# Patient Record
Sex: MALE | Race: WHITE | NOT HISPANIC OR LATINO | Employment: FULL TIME | URBAN - METROPOLITAN AREA
[De-identification: names, ages, dates, MRNs, and addresses within clinical notes are randomized per-mention and may not be internally consistent; named-entity substitution may affect disease eponyms.]

---

## 2022-10-22 ENCOUNTER — APPOINTMENT (OUTPATIENT)
Dept: GENERAL RADIOLOGY | Facility: CLINIC | Age: 37
End: 2022-10-22
Attending: EMERGENCY MEDICINE
Payer: COMMERCIAL

## 2022-10-22 ENCOUNTER — APPOINTMENT (OUTPATIENT)
Dept: CT IMAGING | Facility: CLINIC | Age: 37
End: 2022-10-22
Attending: EMERGENCY MEDICINE
Payer: COMMERCIAL

## 2022-10-22 ENCOUNTER — HOSPITAL ENCOUNTER (EMERGENCY)
Facility: CLINIC | Age: 37
Discharge: HOME | End: 2022-10-22
Attending: EMERGENCY MEDICINE | Admitting: EMERGENCY MEDICINE
Payer: COMMERCIAL

## 2022-10-22 VITALS
RESPIRATION RATE: 20 BRPM | DIASTOLIC BLOOD PRESSURE: 99 MMHG | SYSTOLIC BLOOD PRESSURE: 175 MMHG | HEART RATE: 77 BPM | OXYGEN SATURATION: 96 %

## 2022-10-22 DIAGNOSIS — R04.2 HEMOPTYSIS: ICD-10-CM

## 2022-10-22 LAB
ANION GAP SERPL CALCULATED.3IONS-SCNC: 5 MMOL/L (ref 3–14)
BASOPHILS # BLD AUTO: 0.1 10E3/UL (ref 0–0.2)
BASOPHILS NFR BLD AUTO: 1 %
BUN SERPL-MCNC: 19 MG/DL (ref 7–30)
CALCIUM SERPL-MCNC: 8.7 MG/DL (ref 8.5–10.1)
CHLORIDE BLD-SCNC: 114 MMOL/L (ref 94–109)
CO2 SERPL-SCNC: 25 MMOL/L (ref 20–32)
CREAT SERPL-MCNC: 0.98 MG/DL (ref 0.66–1.25)
D DIMER PPP FEU-MCNC: 0.34 UG/ML FEU (ref 0–0.5)
EOSINOPHIL # BLD AUTO: 0.1 10E3/UL (ref 0–0.7)
EOSINOPHIL NFR BLD AUTO: 1 %
ERYTHROCYTE [DISTWIDTH] IN BLOOD BY AUTOMATED COUNT: 12.5 % (ref 10–15)
GFR SERPL CREATININE-BSD FRML MDRD: >90 ML/MIN/1.73M2
GLUCOSE BLD-MCNC: 145 MG/DL (ref 70–99)
HCT VFR BLD AUTO: 44.1 % (ref 40–53)
HGB BLD-MCNC: 15.4 G/DL (ref 13.3–17.7)
IMM GRANULOCYTES # BLD: 0 10E3/UL
IMM GRANULOCYTES NFR BLD: 0 %
LYMPHOCYTES # BLD AUTO: 3.4 10E3/UL (ref 0.8–5.3)
LYMPHOCYTES NFR BLD AUTO: 36 %
MCH RBC QN AUTO: 30.7 PG (ref 26.5–33)
MCHC RBC AUTO-ENTMCNC: 34.9 G/DL (ref 31.5–36.5)
MCV RBC AUTO: 88 FL (ref 78–100)
MONOCYTES # BLD AUTO: 0.5 10E3/UL (ref 0–1.3)
MONOCYTES NFR BLD AUTO: 6 %
NEUTROPHILS # BLD AUTO: 5.3 10E3/UL (ref 1.6–8.3)
NEUTROPHILS NFR BLD AUTO: 56 %
NRBC # BLD AUTO: 0 10E3/UL
NRBC BLD AUTO-RTO: 0 /100
PLATELET # BLD AUTO: 227 10E3/UL (ref 150–450)
POTASSIUM BLD-SCNC: 3.6 MMOL/L (ref 3.4–5.3)
RBC # BLD AUTO: 5.01 10E6/UL (ref 4.4–5.9)
SODIUM SERPL-SCNC: 144 MMOL/L (ref 133–144)
WBC # BLD AUTO: 9.5 10E3/UL (ref 4–11)

## 2022-10-22 PROCEDURE — 71046 X-RAY EXAM CHEST 2 VIEWS: CPT

## 2022-10-22 PROCEDURE — 85025 COMPLETE CBC W/AUTO DIFF WBC: CPT | Performed by: EMERGENCY MEDICINE

## 2022-10-22 PROCEDURE — 99285 EMERGENCY DEPT VISIT HI MDM: CPT | Mod: 25 | Performed by: EMERGENCY MEDICINE

## 2022-10-22 PROCEDURE — 85379 FIBRIN DEGRADATION QUANT: CPT | Performed by: EMERGENCY MEDICINE

## 2022-10-22 PROCEDURE — 99284 EMERGENCY DEPT VISIT MOD MDM: CPT | Performed by: EMERGENCY MEDICINE

## 2022-10-22 PROCEDURE — 250N000011 HC RX IP 250 OP 636: Performed by: EMERGENCY MEDICINE

## 2022-10-22 PROCEDURE — 250N000009 HC RX 250: Performed by: EMERGENCY MEDICINE

## 2022-10-22 PROCEDURE — 36415 COLL VENOUS BLD VENIPUNCTURE: CPT | Performed by: EMERGENCY MEDICINE

## 2022-10-22 PROCEDURE — 80048 BASIC METABOLIC PNL TOTAL CA: CPT | Performed by: EMERGENCY MEDICINE

## 2022-10-22 PROCEDURE — 71260 CT THORAX DX C+: CPT

## 2022-10-22 RX ORDER — IOPAMIDOL 755 MG/ML
500 INJECTION, SOLUTION INTRAVASCULAR ONCE
Status: COMPLETED | OUTPATIENT
Start: 2022-10-22 | End: 2022-10-22

## 2022-10-22 RX ADMIN — IOPAMIDOL 75 ML: 755 INJECTION, SOLUTION INTRAVENOUS at 21:29

## 2022-10-22 RX ADMIN — SODIUM CHLORIDE 75 ML: 9 INJECTION, SOLUTION INTRAVENOUS at 21:30

## 2022-10-22 ASSESSMENT — ACTIVITIES OF DAILY LIVING (ADL)
ADLS_ACUITY_SCORE: 35
ADLS_ACUITY_SCORE: 35

## 2022-10-23 NOTE — DISCHARGE INSTRUCTIONS
Your chest xray and blood work looked good. The screening blood test for a blood clot was negative (normal).  The CT scan showed some haziness in your right lung and this could be infectious or it could be inflammatory.  Since you keep having episodes of coughing up blood I have placed a referral to pulmonology.  Should this rapidly worsen return to the emergency department otherwise follow-up with pulmonology.  He may need further work-up or possibly bronchoscopy.  I hope this goes away quickly.  It was a pleasure to meet you.

## 2022-10-23 NOTE — ED TRIAGE NOTES
Triage Assessment     Row Name 10/22/22 1958       Triage Assessment (Adult)    Airway WDL WDL       Respiratory WDL    Respiratory WDL WDL            Had one episode this evening of intense coughing and coughed up some blood.  No N/V or fever associated.  Dizziness is a long standing issue he sees a neurologist for related top migraines.

## 2022-10-23 NOTE — ED PROVIDER NOTES
History     Chief Complaint   Patient presents with     Cough     HPI  Arsalan Mittal is a 36 year old male who presents to the emergency department secondary to an episode of coughing up blood.  He was in his normal state of good health and sitting on the couch when he felt like something was in his chest like he was going to have wheezing and he started coughing he went in the bathroom and coughed up a fair amount of blood.  Since then his symptoms have resolved.  He does not have shortness of breath, fever, headache, body aches that are new.  No abdominal pain melena or hematochezia.  He does have acid reflux but has not felt like he was regurgitating blood and then coughed that up.  He has not had night sweats, fevers, weight loss, malaise.  He has been feeling quite well.  He does suffer from chronic migraines.  No pleuritic chest pain.    Allergies:  No Known Allergies    Problem List:    There are no problems to display for this patient.       Past Medical History:    No past medical history on file.    Past Surgical History:    No past surgical history on file.    Family History:    No family history on file.    Social History:  Marital Status:  Single [1]  Social History     Tobacco Use     Smoking status: Every Day     Packs/day: 0.50     Types: Cigarettes   Substance Use Topics     Alcohol use: No     Drug use: No        Medications:    EXCEDRIN MIGRAINE 250-250-65 MG OR TABS  HYDROcodone-acetaminophen (NORCO) 5-325 MG per tablet          Review of Systems   All other systems reviewed and are negative.      Physical Exam   BP: (!) 175/99  Pulse: 77  Resp: 20  SpO2: 96 %      Physical Exam  Vitals and nursing note reviewed.   Constitutional:       General: He is not in acute distress.     Appearance: He is well-developed. He is not diaphoretic.   HENT:      Head: Normocephalic and atraumatic.      Right Ear: External ear normal.      Left Ear: External ear normal.      Nose: Nose normal.       Mouth/Throat:      Mouth: Mucous membranes are moist.      Pharynx: Oropharynx is clear. No oropharyngeal exudate.   Eyes:      General: No scleral icterus.     Extraocular Movements: Extraocular movements intact.      Pupils: Pupils are equal, round, and reactive to light.   Cardiovascular:      Rate and Rhythm: Normal rate and regular rhythm.   Pulmonary:      Effort: Pulmonary effort is normal.      Breath sounds: Normal breath sounds.   Abdominal:      Tenderness: There is no abdominal tenderness. There is no guarding or rebound.   Musculoskeletal:         General: Normal range of motion.      Cervical back: Normal range of motion and neck supple.      Right lower leg: No edema.      Left lower leg: No edema.   Skin:     General: Skin is warm and dry.      Findings: No rash.   Neurological:      General: No focal deficit present.      Mental Status: He is alert and oriented to person, place, and time.   Psychiatric:         Mood and Affect: Mood normal.         ED Course                 Procedures               Results for orders placed or performed during the hospital encounter of 10/22/22 (from the past 24 hour(s))   CBC with platelets differential    Narrative    The following orders were created for panel order CBC with platelets differential.  Procedure                               Abnormality         Status                     ---------                               -----------         ------                     CBC with platelets and d...[[id number]]                      Final result                 Please view results for these tests on the individual orders.   Basic metabolic panel   Result Value Ref Range    Sodium 144 133 - 144 mmol/L    Potassium 3.6 3.4 - 5.3 mmol/L    Chloride 114 (H) 94 - 109 mmol/L    Carbon Dioxide (CO2) 25 20 - 32 mmol/L    Anion Gap 5 3 - 14 mmol/L    Urea Nitrogen 19 7 - 30 mg/dL    Creatinine 0.98 0.66 - 1.25 mg/dL    Calcium 8.7 8.5 - 10.1 mg/dL    Glucose 145 (H) 70 - 99  mg/dL    GFR Estimate >90 >60 mL/min/1.73m2   D dimer quantitative   Result Value Ref Range    D-Dimer Quantitative 0.34 0.00 - 0.50 ug/mL FEU    Narrative    This D-dimer assay is intended for use in conjunction with a clinical pretest probability assessment model to exclude pulmonary embolism (PE) and deep venous thrombosis (DVT) in outpatients suspected of PE or DVT. The cut-off value is 0.50 ug/mL FEU.   CBC with platelets and differential   Result Value Ref Range    WBC Count 9.5 4.0 - 11.0 10e3/uL    RBC Count 5.01 4.40 - 5.90 10e6/uL    Hemoglobin 15.4 13.3 - 17.7 g/dL    Hematocrit 44.1 40.0 - 53.0 %    MCV 88 78 - 100 fL    MCH 30.7 26.5 - 33.0 pg    MCHC 34.9 31.5 - 36.5 g/dL    RDW 12.5 10.0 - 15.0 %    Platelet Count 227 150 - 450 10e3/uL    % Neutrophils 56 %    % Lymphocytes 36 %    % Monocytes 6 %    % Eosinophils 1 %    % Basophils 1 %    % Immature Granulocytes 0 %    NRBCs per 100 WBC 0 <1 /100    Absolute Neutrophils 5.3 1.6 - 8.3 10e3/uL    Absolute Lymphocytes 3.4 0.8 - 5.3 10e3/uL    Absolute Monocytes 0.5 0.0 - 1.3 10e3/uL    Absolute Eosinophils 0.1 0.0 - 0.7 10e3/uL    Absolute Basophils 0.1 0.0 - 0.2 10e3/uL    Absolute Immature Granulocytes 0.0 <=0.4 10e3/uL    Absolute NRBCs 0.0 10e3/uL   XR Chest 2 Views    Narrative    EXAM: XR CHEST 2 VIEWS  LOCATION: [facility]  DATE/TIME: 10/22/2022 8:42 PM    INDICATION: coughed up blood  COMPARISON: None.      Impression    IMPRESSION: Negative chest.   CT Chest w Contrast    Narrative    EXAM: CT CHEST W CONTRAST  LOCATION: [facility]  DATE/TIME: 10/22/2022 9:40 PM    INDICATION: hemoptysis, negative ddimer  COMPARISON: Same day chest radiograph.  TECHNIQUE: CT chest with IV contrast. Multiplanar reformats were obtained. Dose reduction techniques were used.    CONTRAST: 75mLs Isovue 370    FINDINGS:   LUNGS AND PLEURA: Subtle groundglass opacities in the right middle and lower lobes.  No pleural effusion or pneumothorax.    MEDIASTINUM/AXILLAE: No lymphadenopathy. No thoracic aneurysm.    CORONARY ARTERY CALCIFICATION: None.    UPPER ABDOMEN: No significant finding.    MUSCULOSKELETAL: No acute bony abnormality.      Impression    IMPRESSION:   1.  Subtle groundglass opacities in the right middle and lower lobes, favor infectious/inflammatory process.       Medications   iopamidol (ISOVUE-370) solution 500 mL (75 mLs Intravenous Given 10/22/22 2129)   Sodium Chloride 0.9 % bag 100mL for CT scan (75 mLs Intravenous Given 10/22/22 2130)       Assessments & Plan (with Medical Decision Making)  36-year-old healthy male with an episode of hemoptysis resolved.  He does not have any ongoing symptoms nor did he have preceding symptoms except just prior to coughing up blood.  He has not had any melena or hematochezia.  No symptoms of malignancy such as weight loss, night sweats or fatigue.  He does not have any symptoms of COVID including no ongoing cough or fever body aches or malaise or sore throat.  No recent nosebleeds.  Etiology of his hemoptysis is unclear.  We will start with a chest x-ray and some blood work.  He does not have any leg swelling or pleuritic chest pain that would suggest a pulmonary embolism.  No shortness of breath.  No symptoms of pneumonia.  Chest x-ray blood work is clear.  I went in to reevaluate the patient is still feeling well but he did have another episode of hemoptysis.  Given uncertain etiology of this and the fact that he does not have a cold or pneumonia or other distinct etiology for this I gave him the option to proceed with CT scan of the chest to rule out masses or polyps or to look for potential cause of his hemoptysis.  He wanted to proceed with that.  I did not order the pulmonary embolism study given his D-dimer is negative but I did order the CAT scan with contrast.  CT scan shows groundglass opacities in the right middle and lower lobe.  He does not really  have any infectious symptoms and it is not diffuse.  This seems unlikely to be COVID given he has not been sick or had a cough or body aches or fever or malaise or fatigue.  This could be infectious versus inflammatory.  He had a third episode of hemoptysis here in the emergency department.  I placed a referral to pulmonology.  Should this persist they may want to do further work-up.  I discussed with the patient the differential diagnosis including infectious versus refuel possible autoimmune/inflammatory conditions.  The patient feels perfectly fine and is stable for discharge home.  Return to ER precautions and fall precautions discussed.  All questions answered prior to discharge.     I have reviewed the nursing notes.    I have reviewed the findings, diagnosis, plan and need for follow up with the patient.      New Prescriptions    No medications on file       Final diagnoses:   Hemoptysis       10/22/2022   [facility] EMERGENCY DEPT     Crescencio Al MD  10/22/22 3121

## 2022-10-24 ENCOUNTER — TELEPHONE (OUTPATIENT)
Dept: PULMONOLOGY | Facility: CLINIC | Age: 37
End: 2022-10-24

## 2022-10-24 ENCOUNTER — VIRTUAL VISIT (OUTPATIENT)
Dept: FAMILY MEDICINE | Facility: CLINIC | Age: 37
End: 2022-10-24
Payer: COMMERCIAL

## 2022-10-24 DIAGNOSIS — R91.8 GROUND GLASS OPACITY PRESENT ON IMAGING OF LUNG: ICD-10-CM

## 2022-10-24 DIAGNOSIS — R04.2 HEMOPTYSIS: Primary | ICD-10-CM

## 2022-10-24 PROCEDURE — 99203 OFFICE O/P NEW LOW 30 MIN: CPT | Mod: 95 | Performed by: FAMILY MEDICINE

## 2022-10-24 ASSESSMENT — PAIN SCALES - GENERAL: PAINLEVEL: NO PAIN (0)

## 2022-10-24 NOTE — PROGRESS NOTES
Arsalan is a 36 year old who is being evaluated via a billable video visit.      How would you like to obtain your AVS? MyChart  If the video visit is dropped, the invitation should be resent by: Text to cell phone: [phone]  Will anyone else be joining your video visit? No, wife will be present          Assessment & Plan     Hemoptysis    Ground glass opacity present on imaging of lung    Patient appeared to have no acute signs on video. Not in distress.   Reviewed with patient in detail his CT scan chest, CXR and blood tests from the ER visit. No follow up testing needed prior to his pulm med consult.  His CBC did not show leukocytosis to suggest inflammation or infection, nor was he anemic.  Return precautions discussed and given to patient.  Reviewed with patient family history - no identified chronic lung conditions or inflammatory conditions running in the family.    Patient Instructions   Based on the recent testing, and findings on your exams, the next step in managing or diagnosing your condition is seeing the lung specialist for further opinion and recommendations.    Should you experience multiple episodes of coughing up blood in a day, sudden weight loss, fevers, or shortness of breath, seek prompt medical attention.    Contact your primary care doctor to address your health maintenance items that are due now.    Read more information about your conditions in the handouts attached to this visit summary.       No follow-ups on file.    Nemesio Avendano MD  [facility]    Subjective   Arsalan is a 36 year old accompanied by his spouse, presenting for the following health issues:  ER F/U (Pt being seen for post e/r follow up. )      [facility]     ED/UC Followup:    Facility:  [facility]  Date of visit: 10/22/22  Reason for visit: coughing up blood  Current Status: Has not coughed up any blood since Sunday morning.   Appt with pulmonology 11/11/22.  X-ray and ct negative, also lab  work.    Patient said he quit smoking about 2 years ago.  He denies hx of chronic lung condition in himself or the family.  He reports mitral valve stenosis in her mother and grandmother.  Patient denies weight loss, weight gain, fever. He reports he has had a few other episodes of hemoptysis since the ER visit the last one was yesterday.    Patient denies any other symptoms on ROS.    Review of Systems   Constitutional, HEENT, cardiovascular, pulmonary, GI, , musculoskeletal, neuro, skin, endocrine and psych systems are negative, except as otherwise noted.      Objective    Vitals - Patient Reported  Pain Score: No Pain (0)        Physical Exam   GENERAL:  alert and no distress  EYES: Eyes grossly normal to inspection.  No discharge or erythema, or obvious scleral/conjunctival abnormalities.  RESP: No audible wheeze, cough, or visible cyanosis.  No visible retractions or increased work of breathing.    RESP: no cough, no audible wheezing, speaks in complete sentences  SKIN: Visible skin clear. No significant rash, abnormal pigmentation or lesions.  NEURO: Cranial nerves grossly intact.  Mentation and speech appropriate for age.  PSYCH: Mentation appears normal, affect normal/bright, judgement and insight intact, normal speech and appearance well-groomed.    Admission on 10/22/2022, Discharged on 10/22/2022   Component Date Value Ref Range Status     Sodium 10/22/2022 144  133 - 144 mmol/L Final     Potassium 10/22/2022 3.6  3.4 - 5.3 mmol/L Final     Chloride 10/22/2022 114 (H)  94 - 109 mmol/L Final     Carbon Dioxide (CO2) 10/22/2022 25  20 - 32 mmol/L Final     Anion Gap 10/22/2022 5  3 - 14 mmol/L Final     Urea Nitrogen 10/22/2022 19  7 - 30 mg/dL Final     Creatinine 10/22/2022 0.98  0.66 - 1.25 mg/dL Final     Calcium 10/22/2022 8.7  8.5 - 10.1 mg/dL Final     Glucose 10/22/2022 145 (H)  70 - 99 mg/dL Final     GFR Estimate 10/22/2022 >90  >60 mL/min/1.73m2 Final    Effective December 21, 2021 eGFRcr in  adults is calculated using the 2021 CKD-EPI creatinine equation which includes age and gender (Gloria et al., [facility], DOI: 10.1056/DMCLrw3237275)     D-Dimer Quantitative 10/22/2022 0.34  0.00 - 0.50 ug/mL FEU Final     WBC Count 10/22/2022 9.5  4.0 - 11.0 10e3/uL Final     RBC Count 10/22/2022 5.01  4.40 - 5.90 10e6/uL Final     Hemoglobin 10/22/2022 15.4  13.3 - 17.7 g/dL Final     Hematocrit 10/22/2022 44.1  40.0 - 53.0 % Final     MCV 10/22/2022 88  78 - 100 fL Final     MCH 10/22/2022 30.7  26.5 - 33.0 pg Final     MCHC 10/22/2022 34.9  31.5 - 36.5 g/dL Final     RDW 10/22/2022 12.5  10.0 - 15.0 % Final     Platelet Count 10/22/2022 227  150 - 450 10e3/uL Final     % Neutrophils 10/22/2022 56  % Final     % Lymphocytes 10/22/2022 36  % Final     % Monocytes 10/22/2022 6  % Final     % Eosinophils 10/22/2022 1  % Final     % Basophils 10/22/2022 1  % Final     % Immature Granulocytes 10/22/2022 0  % Final     NRBCs per 100 WBC 10/22/2022 0  <1 /100 Final     Absolute Neutrophils 10/22/2022 5.3  1.6 - 8.3 10e3/uL Final     Absolute Lymphocytes 10/22/2022 3.4  0.8 - 5.3 10e3/uL Final     Absolute Monocytes 10/22/2022 0.5  0.0 - 1.3 10e3/uL Final     Absolute Eosinophils 10/22/2022 0.1  0.0 - 0.7 10e3/uL Final     Absolute Basophils 10/22/2022 0.1  0.0 - 0.2 10e3/uL Final     Absolute Immature Granulocytes 10/22/2022 0.0  <=0.4 10e3/uL Final     Absolute NRBCs 10/22/2022 0.0  10e3/uL Final             Video-Visit Details    Video Start Time: 4:03 pm    Type of service:  Video Visit    Video End Time:4:14 PM    Originating Location (pt. Location): Home    Distant Location (provider location):  Off-site    Platform used for Video Visit: Kari

## 2022-10-24 NOTE — PATIENT INSTRUCTIONS
Based on the recent testing, and findings on your exams, the next step in managing or diagnosing your condition is seeing the lung specialist for further opinion and recommendations.    Should you experience multiple episodes of coughing up blood in a day, sudden weight loss, fevers, or shortness of breath, seek prompt medical attention.    Contact your primary care doctor to address your health maintenance items that are due now.    Read more information about your conditions in the handouts attached to this visit summary.

## 2022-10-24 NOTE — TELEPHONE ENCOUNTER
Writer consulted with Dr. Jacob regarding below, patient rescheduled to a DR only appt 11/11. Patient aware.    Doretha Madison RN on 10/24/2022 at 12:11 PM

## 2022-10-24 NOTE — TELEPHONE ENCOUNTER
Reason for Call:  Other appointment    Detailed comments: Patient is scheduled for Dr. Jacob on 12/09 but wondering if that is too far out for his condition of Hemoptysis [R04.2]? Should he be worked in sooner?     Phone Number Patient can be reached at: Home number on file [phone] (home)     Best Time: any    Can we leave a detailed message on this number? YES    Call taken on 10/24/2022 at 8:23 AM by Baylee Flores

## 2022-11-11 ENCOUNTER — OFFICE VISIT (OUTPATIENT)
Dept: PULMONOLOGY | Facility: CLINIC | Age: 37
End: 2022-11-11
Payer: COMMERCIAL

## 2022-11-11 VITALS
OXYGEN SATURATION: 99 % | WEIGHT: 258 LBS | BODY MASS INDEX: 34.19 KG/M2 | DIASTOLIC BLOOD PRESSURE: 92 MMHG | HEIGHT: 73 IN | SYSTOLIC BLOOD PRESSURE: 144 MMHG

## 2022-11-11 DIAGNOSIS — R04.2 HEMOPTYSIS: ICD-10-CM

## 2022-11-11 LAB
FEF 25/75: NORMAL
FEV-1: NORMAL
FEV1/FVC: NORMAL
FVC: NORMAL

## 2022-11-11 PROCEDURE — 94010 BREATHING CAPACITY TEST: CPT | Performed by: INTERNAL MEDICINE

## 2022-11-11 PROCEDURE — 99204 OFFICE O/P NEW MOD 45 MIN: CPT | Mod: 25 | Performed by: INTERNAL MEDICINE

## 2022-11-11 RX ORDER — SUMATRIPTAN 100 MG/1
TABLET, FILM COATED ORAL
COMMUNITY
Start: 2021-01-29 | End: 2023-05-03

## 2022-11-11 RX ORDER — TOPIRAMATE 25 MG/1
TABLET, FILM COATED ORAL
COMMUNITY
Start: 2022-09-24 | End: 2023-05-05

## 2022-11-11 RX ORDER — CANDESARTAN 8 MG/1
TABLET ORAL
COMMUNITY
Start: 2022-08-22 | End: 2023-05-05

## 2022-11-18 NOTE — PROGRESS NOTES
Mr. Mittal is a generally healthy 36-year-old male referred by Dr Al from the emergency room for evaluation of hemoptysis.    He was seen in the emergency room on October 22 as that day he felt some congestion in his lungs, he coughed a few times and brought up bright red blood.  He showed a picture of this in his sink and it appeared to be fresh hemoptysis.  This happened once more than he came to the emergency room for evaluation.  Prior to this he was feeling completely normal.  Initially had no chest pain, fever, wheeze, cough with nonbloody sputum production, wheeze, lower extremity swelling, shortness of breath.  No bleeding anywhere else.  He is quite certain this is not coming from epistaxis or other sources of swallowed blood.  No melena.  No inhaled toxins, no vaping.  Current non-smoker.  This has never happened to him before.    He was evaluated in the emergency room.  Oxygen levels were normal.  Hemoglobin 15.4.  Normal differential.  Chest x-ray was clear.  CT scan was tailored for systemic artery contrast enhancement because his D-dimer was normal but there was some contrast in the pulmonary arteries although suboptimal but there was no pulmonary embolism seen..  Lung parenchyma showed faint right middle lobe anterior groundglass opacities and faint although more diffuse groundglass opacities in the right lower lobe.  No pleural effusion or pleural thickening.  No lymphadenopathy.  Central airways were clear.  No mass or cavitation.    He was discharged from the emergency room with recommendation to follow-up with pulmonary.  No medications were given.  Next day coughed up some more blood although it appeared slightly darker.  The next day little bit less but nothing since then.  Continues to feel otherwise normal.  No shortness of breath.  No fever.  No wheezing no other bleeding seen.  Does not feel like he has a infection.  On the day after the emergency room visit he had some pain under his  right breast but this resolved spontaneously.  He did not miss any work.  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 Has excellent exercise capacity.   No ear or sinus or nasal symptoms.  No significant allergy problems.  smoked 1 pack/day starting his late teens but quit at age 34 after 15 years.  Had no respiratory symptoms when he was a smoker.    Family history is negative for chronic lung disease or hemoptysis.      Past Medical History:   Diagnosis Date     Hypertension      Current Outpatient Medications   Medication Sig Dispense Refill     candesartan (ATACAND) 8 MG tablet        omeprazole (PRILOSEC) 20 MG DR capsule        SUMAtriptan (IMITREX) 100 MG tablet Take 1 tablet at onset of headache may repeat once after 2 hrs if headache recurs       topiramate (TOPAMAX) 25 MG tablet        Family History   Problem Relation Age of Onset     Hypertension Mother      Social History     Socioeconomic History     Marital status:      Spouse name: Not on file     Number of children: Not on file     Years of education: Not on file     Highest education level: Not on file   Occupational History     Not on file   Tobacco Use     Smoking status: Former     Packs/day: 0.00     Years: 10.00     Pack years: 0.00     Types: Cigarettes     Quit date: 2020     Years since quittin.5     Smokeless tobacco: Never   Vaping Use     Vaping Use: Never used   Substance and Sexual Activity     Alcohol use: No     Drug use: No     Sexual activity: Yes     Partners: Female     Birth control/protection: None   Other Topics Concern     Parent/sibling w/ CABG, MI or angioplasty before 65F 55M? No   Social History Narrative     Not on file     Social Determinants of Health     Financial Resource Strain: Not on file   Food Insecurity: Not on file   Transportation Needs: Not on file   Physical Activity: Not on file   Stress: Not on file   Social Connections: Not on file   Intimate Partner Violence: Not on  "file   Housing Stability: Not on file     Constitutional: mild improving  Fatigue, no fever and weight loss  Eyes: NEGATIVE for vision changes or irritation and redness.  ENT/Mouth: NEGATIVE for hoarseness and sore throat  CV: NEGATIVE for persistent  chest pain, palpitations or chest pressure, lower extremity edema and syncope or near-syncope  Respiratory:  Resolved cough w/o shortness of breath,  excessive sleepiness  GI: NEGATIVE for nausea, abdominal pain, heartburn, or change in bowel habits  Musculoskeletal: no arthralgias or joint swelling  Integumentary/Skin: NEGATIVE for rash  Neurological:  NEGATIVE for numbness or tingling and focal weakness  Hemotologic/Lymphatic: NEGATIVE for bleeding disorder and swollen nodes  Allergic/Immunologic: No rhinitis or hives  RESPIRATORY EXAM:  BP (!) 144/92   Ht 1.842 m (6' 0.5\")   Wt 117 kg (258 lb)   SpO2 99%   BMI 34.51 kg/m    Patient is well-nourished and well-appearing   No coughing  Moves easily to exam table without dyspnea, voice quality normal  Nares have no obstruction or d/c and normal mucosa.  No carlos-pharyngeal lesions.    No neck masses or thyromegaly or jugular venous distention   Symmetrical chest, normal configuration, without accessory muscle use or tenderness on palpation. Clear breath sounds. No stridor.   Normal S1 and S2 heart sounds without murmur rub or gallop. No limb edema.  No abdominal distension  No neck or supraclavicular adenopathy.   No muscle atrophy. 5/5 lower limb strength bilaterally.  No tremor.  No digit clubbing.  No skin rash.   Affect is normal; cognition is normal.       Pulmonary function test obtained today and reviewed by me.  Showed good effort.  Vital capacity 6.21 L, 107% of predicted.  FEV1 4.93 L, 106% of predicted with a ratio of 79%.  This is normal spirometry.  No priors to compare.    CT scan as described above.  No priors to compare.    Assessment: Single episode of hemoptysis.  Uncertain cause at this point.  He " has not had further hemoptysis in the approximate 3 weeks since the ED evaluation.  No mass or airway abnormality to suggest cause.  The faint groundglass opacities are probably more blood in the lung parenchyma as opposed to the source of bleeding.,  No history of inhaled causes, coagulopathy, other sources of bleeding, anemia, or systemic symptoms suggest rare causes such as vasculitis, arterial venous malformation,.  Although not tailored for pulmonary embolism, patient is quite active, has no risk factors for DVT and had a negative D-dimer therefore pulmonary embolism seems unlikely.    Patient was told of the uncertainty of the diagnosis which sometimes occurs in the first episode of hemoptysis.  It was recommended that he return to the emergency room if this were to happen again, another CT scan would be indicated and if there are persistent episodes then bronchoscopy to evaluate the airways would also be recommended.    8 minutes prep prior to visit on DOS, 32 minutes face to face and 6 minutes post visit on DOS.  Total  46  Jaydon Jacob MD, MPH  Associate Professor of Medicine

## 2022-11-21 ENCOUNTER — HEALTH MAINTENANCE LETTER (OUTPATIENT)
Age: 37
End: 2022-11-21

## 2023-02-08 ENCOUNTER — OFFICE VISIT (OUTPATIENT)
Dept: FAMILY MEDICINE | Facility: CLINIC | Age: 38
End: 2023-02-08
Payer: COMMERCIAL

## 2023-02-08 VITALS
BODY MASS INDEX: 34.46 KG/M2 | RESPIRATION RATE: 10 BRPM | WEIGHT: 260 LBS | DIASTOLIC BLOOD PRESSURE: 84 MMHG | SYSTOLIC BLOOD PRESSURE: 134 MMHG | HEART RATE: 78 BPM | OXYGEN SATURATION: 97 % | HEIGHT: 73 IN | TEMPERATURE: 98.4 F

## 2023-02-08 DIAGNOSIS — I10 HYPERTENSION, UNSPECIFIED TYPE: ICD-10-CM

## 2023-02-08 DIAGNOSIS — R00.2 PALPITATIONS: ICD-10-CM

## 2023-02-08 DIAGNOSIS — R07.9 CHEST PAIN, UNSPECIFIED TYPE: Primary | ICD-10-CM

## 2023-02-08 DIAGNOSIS — Z13.220 SCREENING FOR HYPERLIPIDEMIA: ICD-10-CM

## 2023-02-08 LAB
ANION GAP SERPL CALCULATED.3IONS-SCNC: 11 MMOL/L (ref 7–15)
BUN SERPL-MCNC: 18.9 MG/DL (ref 6–20)
CALCIUM SERPL-MCNC: 9.1 MG/DL (ref 8.6–10)
CHLORIDE SERPL-SCNC: 103 MMOL/L (ref 98–107)
CHOLEST SERPL-MCNC: 206 MG/DL
CREAT SERPL-MCNC: 1.02 MG/DL (ref 0.67–1.17)
DEPRECATED HCO3 PLAS-SCNC: 23 MMOL/L (ref 22–29)
ERYTHROCYTE [DISTWIDTH] IN BLOOD BY AUTOMATED COUNT: 13 % (ref 10–15)
GFR SERPL CREATININE-BSD FRML MDRD: >90 ML/MIN/1.73M2
GLUCOSE SERPL-MCNC: 103 MG/DL (ref 70–99)
HCT VFR BLD AUTO: 45.4 % (ref 40–53)
HDLC SERPL-MCNC: 42 MG/DL
HGB BLD-MCNC: 15.4 G/DL (ref 13.3–17.7)
LDLC SERPL CALC-MCNC: 130 MG/DL
MCH RBC QN AUTO: 29.9 PG (ref 26.5–33)
MCHC RBC AUTO-ENTMCNC: 33.9 G/DL (ref 31.5–36.5)
MCV RBC AUTO: 88 FL (ref 78–100)
NONHDLC SERPL-MCNC: 164 MG/DL
PLATELET # BLD AUTO: 259 10E3/UL (ref 150–450)
POTASSIUM SERPL-SCNC: 3.8 MMOL/L (ref 3.4–5.3)
RBC # BLD AUTO: 5.15 10E6/UL (ref 4.4–5.9)
SODIUM SERPL-SCNC: 137 MMOL/L (ref 136–145)
TRIGL SERPL-MCNC: 171 MG/DL
TSH SERPL DL<=0.005 MIU/L-ACNC: 1.81 UIU/ML (ref 0.3–4.2)
WBC # BLD AUTO: 10.1 10E3/UL (ref 4–11)

## 2023-02-08 PROCEDURE — 85027 COMPLETE CBC AUTOMATED: CPT | Performed by: STUDENT IN AN ORGANIZED HEALTH CARE EDUCATION/TRAINING PROGRAM

## 2023-02-08 PROCEDURE — 84443 ASSAY THYROID STIM HORMONE: CPT | Performed by: STUDENT IN AN ORGANIZED HEALTH CARE EDUCATION/TRAINING PROGRAM

## 2023-02-08 PROCEDURE — 36415 COLL VENOUS BLD VENIPUNCTURE: CPT | Performed by: STUDENT IN AN ORGANIZED HEALTH CARE EDUCATION/TRAINING PROGRAM

## 2023-02-08 PROCEDURE — 93000 ELECTROCARDIOGRAM COMPLETE: CPT | Performed by: STUDENT IN AN ORGANIZED HEALTH CARE EDUCATION/TRAINING PROGRAM

## 2023-02-08 PROCEDURE — 80061 LIPID PANEL: CPT | Performed by: STUDENT IN AN ORGANIZED HEALTH CARE EDUCATION/TRAINING PROGRAM

## 2023-02-08 PROCEDURE — 80048 BASIC METABOLIC PNL TOTAL CA: CPT | Performed by: STUDENT IN AN ORGANIZED HEALTH CARE EDUCATION/TRAINING PROGRAM

## 2023-02-08 PROCEDURE — 99214 OFFICE O/P EST MOD 30 MIN: CPT | Performed by: STUDENT IN AN ORGANIZED HEALTH CARE EDUCATION/TRAINING PROGRAM

## 2023-02-08 NOTE — PROGRESS NOTES
"  Assessment & Plan     Chest pain, unspecified type  Palpitations  Patient with nonspecific chest pain and palpitation.  Does not seem exertional in nature and given lack of risk factors I think unlikely coronary artery disease contributing.  Could consider stress testing in the future however.  EKG with nonspecific conduction delay and somewhat irregular rhythm.  No obvious heart block.  Patient asymptomatic now.  Plan for Holter monitor and potential follow-up with cardiology.  - EKG 12-lead complete w/read - Clinics  - CBC with platelets  - TSH with free T4 reflex  - Basic metabolic panel  (Ca, Cl, CO2, Creat, Gluc, K, Na, BUN)  - Basic metabolic panel  (Ca, Cl, CO2, Creat, Gluc, K, Na, BUN)  - TSH with free T4 reflex  - CBC with platelets    Screening for hyperlipidemia  - Lipid panel reflex to direct LDL Non-fasting  - Lipid panel reflex to direct LDL Non-fasting    Hypertension, unspecified type  Blood pressure stable in clinic today.  Recommend continue to monitor at home given previously elevated pressures with consideration to up medication in the future.       BMI:   Estimated body mass index is 34.09 kg/m  as calculated from the following:    Height as of this encounter: 1.86 m (6' 1.23\").    Weight as of this encounter: 117.9 kg (260 lb).   Weight management plan: Discussed healthy diet and exercise guidelines    Raman Hernandez MD  [facility]   Arsalan is a 37 year old accompanied by his spouse, presenting for the following health issues:  Chest Pain      History of Present Illness       Reason for visit:  Occasion chest pains, heart flutters  Symptom onset:  More than a month  Symptoms include:  Tight chest, chest gas pains  Symptom intensity:  Moderate  Symptom progression:  Staying the same  Had these symptoms before:  No  What makes it worse:  No  What makes it better:  No    He eats 2-3 servings of fruits and vegetables daily.He consumes 6 sweetened " "beverage(s) daily.He exercises with enough effort to increase his heart rate 30 to 60 minutes per day.  He exercises with enough effort to increase his heart rate 5 days per week.   He is taking medications regularly.     Patient notes occasional discomfort in her chest that lasts for a few seconds and dissipates.  Usually noted with the palpitation where he feels he can tell his heart is beating more but not specifically faster.  Does seem to radiate up his esophagus and he does have history of reflux.  Currently on Protonix.  He does not have any shortness of breath wheezing or coughing with this.  It does not seem to be related to eating or certain positions.  It is not with exertion.  No issues with exercise tolerance.  Seems to be random but unsure if any stress or anxiety contributing.  Of note he was in the ER at this October with some hemoptysis and did see pulmonology.  Unknown reason at that time with no further work-up.  No recent illnesses or other changes for him.  Does take migraine medication as well as blood pressure medicine.    Review of Systems   Constitutional, HEENT, cardiovascular, pulmonary, gi and gu systems are negative, except as otherwise noted.      Objective    /84   Pulse 78   Temp 98.4  F (36.9  C)   Resp 10   Ht 1.86 m (6' 1.23\")   Wt 117.9 kg (260 lb)   SpO2 97%   BMI 34.09 kg/m    Body mass index is 34.09 kg/m .  Physical Exam   GENERAL: healthy, alert and no distress  EYES: Eyes grossly normal to inspection, PERRL and conjunctivae and sclerae normal  HENT: nose and mouth without ulcers or lesions  NECK: no adenopathy, no asymmetry, masses, or scars and thyroid normal to palpation  RESP: lungs clear to auscultation - no rales, rhonchi or wheezes  CV: regular rate and rhythm, normal S1 S2, no S3 or S4, no murmur, click or rub, no peripheral edema and peripheral pulses strong  ABDOMEN: soft, nontender, no hepatosplenomegaly, no masses and bowel sounds normal  MS: no gross " musculoskeletal defects noted, no edema  SKIN: no suspicious lesions or rashes  NEURO: Normal strength and tone, mentation intact and speech normal  PSYCH: mentation appears normal, affect normal/bright

## 2023-02-09 ENCOUNTER — MYC MEDICAL ADVICE (OUTPATIENT)
Dept: FAMILY MEDICINE | Facility: CLINIC | Age: 38
End: 2023-02-09
Payer: COMMERCIAL

## 2023-02-14 ENCOUNTER — HOSPITAL ENCOUNTER (OUTPATIENT)
Dept: CARDIOLOGY | Facility: CLINIC | Age: 38
Discharge: HOME | End: 2023-02-14
Attending: STUDENT IN AN ORGANIZED HEALTH CARE EDUCATION/TRAINING PROGRAM | Admitting: STUDENT IN AN ORGANIZED HEALTH CARE EDUCATION/TRAINING PROGRAM
Payer: COMMERCIAL

## 2023-02-14 DIAGNOSIS — R00.2 PALPITATIONS: ICD-10-CM

## 2023-02-14 PROCEDURE — 93242 EXT ECG>48HR<7D RECORDING: CPT

## 2023-03-14 ENCOUNTER — MYC MEDICAL ADVICE (OUTPATIENT)
Dept: FAMILY MEDICINE | Facility: CLINIC | Age: 38
End: 2023-03-14
Payer: COMMERCIAL

## 2023-03-14 DIAGNOSIS — R00.2 PALPITATIONS: Primary | ICD-10-CM

## 2023-03-20 NOTE — TELEPHONE ENCOUNTER
Patient called and will plan to follow up with cardiology. Still some symptoms but were not associated with triggered events. He would like to discuss further with cardiology.

## 2023-04-02 ENCOUNTER — MYC MEDICAL ADVICE (OUTPATIENT)
Dept: FAMILY MEDICINE | Facility: CLINIC | Age: 38
End: 2023-04-02
Payer: COMMERCIAL

## 2023-04-02 DIAGNOSIS — K21.9 GASTROESOPHAGEAL REFLUX DISEASE WITHOUT ESOPHAGITIS: Primary | ICD-10-CM

## 2023-05-02 ASSESSMENT — ENCOUNTER SYMPTOMS
DIZZINESS: 1
NAUSEA: 0
HEMATOCHEZIA: 0
FEVER: 0
CHILLS: 0
ARTHRALGIAS: 0
HEMATURIA: 0
JOINT SWELLING: 0
CONSTIPATION: 0
SORE THROAT: 0
ABDOMINAL PAIN: 0
FREQUENCY: 0
HEARTBURN: 0
NERVOUS/ANXIOUS: 0
SHORTNESS OF BREATH: 0
WEAKNESS: 0
MYALGIAS: 0
HEADACHES: 1
COUGH: 0
DYSURIA: 0
DIARRHEA: 0
PARESTHESIAS: 0
EYE PAIN: 1
PALPITATIONS: 0

## 2023-05-03 ENCOUNTER — MYC MEDICAL ADVICE (OUTPATIENT)
Dept: FAMILY MEDICINE | Facility: CLINIC | Age: 38
End: 2023-05-03

## 2023-05-03 ENCOUNTER — OFFICE VISIT (OUTPATIENT)
Dept: CARDIOLOGY | Facility: CLINIC | Age: 38
End: 2023-05-03
Attending: STUDENT IN AN ORGANIZED HEALTH CARE EDUCATION/TRAINING PROGRAM
Payer: COMMERCIAL

## 2023-05-03 ENCOUNTER — OFFICE VISIT (OUTPATIENT)
Dept: FAMILY MEDICINE | Facility: CLINIC | Age: 38
End: 2023-05-03
Payer: COMMERCIAL

## 2023-05-03 VITALS
HEART RATE: 66 BPM | BODY MASS INDEX: 34.59 KG/M2 | RESPIRATION RATE: 16 BRPM | OXYGEN SATURATION: 97 % | WEIGHT: 261 LBS | SYSTOLIC BLOOD PRESSURE: 128 MMHG | HEIGHT: 73 IN | DIASTOLIC BLOOD PRESSURE: 84 MMHG | TEMPERATURE: 97.8 F

## 2023-05-03 VITALS
WEIGHT: 261 LBS | SYSTOLIC BLOOD PRESSURE: 128 MMHG | HEART RATE: 66 BPM | DIASTOLIC BLOOD PRESSURE: 84 MMHG | OXYGEN SATURATION: 97 % | HEIGHT: 73 IN | BODY MASS INDEX: 34.59 KG/M2

## 2023-05-03 DIAGNOSIS — Z00.00 ROUTINE GENERAL MEDICAL EXAMINATION AT A HEALTH CARE FACILITY: Primary | ICD-10-CM

## 2023-05-03 DIAGNOSIS — I44.0 FIRST DEGREE HEART BLOCK: ICD-10-CM

## 2023-05-03 DIAGNOSIS — R00.2 PALPITATIONS: ICD-10-CM

## 2023-05-03 DIAGNOSIS — I10 HYPERTENSION, UNSPECIFIED TYPE: Primary | ICD-10-CM

## 2023-05-03 DIAGNOSIS — Z13.220 SCREENING FOR HYPERLIPIDEMIA: ICD-10-CM

## 2023-05-03 DIAGNOSIS — G43.709 CHRONIC MIGRAINE WITHOUT AURA WITHOUT STATUS MIGRAINOSUS, NOT INTRACTABLE: ICD-10-CM

## 2023-05-03 DIAGNOSIS — I47.10 SVT (SUPRAVENTRICULAR TACHYCARDIA) (H): ICD-10-CM

## 2023-05-03 DIAGNOSIS — R21 RASH: ICD-10-CM

## 2023-05-03 DIAGNOSIS — I10 HYPERTENSION, UNSPECIFIED TYPE: ICD-10-CM

## 2023-05-03 PROCEDURE — 99395 PREV VISIT EST AGE 18-39: CPT | Performed by: STUDENT IN AN ORGANIZED HEALTH CARE EDUCATION/TRAINING PROGRAM

## 2023-05-03 PROCEDURE — 99203 OFFICE O/P NEW LOW 30 MIN: CPT | Performed by: INTERNAL MEDICINE

## 2023-05-03 RX ORDER — TRIAMCINOLONE ACETONIDE 1 MG/G
CREAM TOPICAL 2 TIMES DAILY
Qty: 80 G | Refills: 0 | Status: SHIPPED | OUTPATIENT
Start: 2023-05-03

## 2023-05-03 ASSESSMENT — ENCOUNTER SYMPTOMS
HEARTBURN: 0
CONSTIPATION: 0
ABDOMINAL PAIN: 0
COUGH: 0
NAUSEA: 0
EYE PAIN: 1
WEAKNESS: 0
FEVER: 0
MYALGIAS: 0
ARTHRALGIAS: 0
FREQUENCY: 0
CHILLS: 0
SORE THROAT: 0
HEMATURIA: 0
DIZZINESS: 1
SHORTNESS OF BREATH: 0
DYSURIA: 0
PARESTHESIAS: 0
NERVOUS/ANXIOUS: 0
PALPITATIONS: 0
DIARRHEA: 0
HEMATOCHEZIA: 0
JOINT SWELLING: 0
HEADACHES: 1

## 2023-05-03 NOTE — PROGRESS NOTES
"CARDIOLOGY CONSULT    REASON FOR CONSULT: palpitations    PRIMARY CARE PHYSICIAN:  Gregorio Persaud    HISTORY OF PRESENT ILLNESS:  Mr. Mittal is a 37 year old gentleman with PMH significant for hypertension who presents for the evaluation of palpitations.   Arsalan states he has noted intermittent palpitations occurring for a number of months.  These occur entirely at random, feel like a \"fluttering\" and last just seconds.  He was seen by his primary MD and a 5 day ZIO patch monitor which I personally reviewed was performed which did not show any significant arrhythmias. He did have a very brief episode of PSVT lasting just 5 beats. He also states that he has had some burning chest discomfort that has occurred at random.  He was started on prilosec and those symptoms improved.  He noted significant caffeine intake with an energy drink in the morning plus multiple caffeinated sodas during the day.  He does not drink significant ETOH.  He admits he could be more active but feels well when he is-denies any exertional chest pain, chest discomfort or shortness of breath.     PAST MEDICAL HISTORY:  1.  Hypertension    MEDICATIONS:  Current Outpatient Medications   Medication     candesartan (ATACAND) 8 MG tablet     omeprazole (PRILOSEC) 20 MG DR capsule     SUMAtriptan (IMITREX) 100 MG tablet     topiramate (TOPAMAX) 25 MG tablet     No current facility-administered medications for this visit.       ALLERGIES:  No Known Allergies    SOCIAL HISTORY:  Quit smoking 3 years ago    FAMILY HISTORY:  No family hx of early CAD. He reports his mother and grandparent had mitral valve disease    REVIEW OF SYSTEMS:  A complete ROS was obtained and the pertinent positives are outlined in the history of present illness above.  The remainder of systems is negative.      PHYSICAL EXAM:                     Vital Signs with Ranges     0 lbs 0 oz    Constitutional: awake, alert, no distress  Eyes: PERRL, sclera nonicteric  ENT: trachea " midline  Respiratory: CTAB  Cardiovascular: RRR no m/r/g, no JVD  GI: nondistended, nontender, bowel sounds present  Lymph/Hematologic: no lymphadenopathy  Skin: dry, no rash  Musculoskeletal: good muscle tone, no edema bilaterally  Neurologic: no focal deficits  Neuropsychiatric: appropriate affact    DATA:      ASSESSMENT:  1. Palpitations:  Brief 5 beats of SVT noted on ZIO patch monitor  2. Chest pain: Consistent with reflux; resolved with prilosec treatment    RECOMMENDATIONS:  1. Reviewed results of zio patch monitor which was overall reassuring.  Will obtain an echocardiogram to exclude underlying structural heart disease.  BMP/TSH/CBC previously unremarkable.  Discussed the role of caffeine in exacerbating palpitations and encouraged him to significantly cut back or eliminate caffeine.  2.  If echocardiogram is unremarkable, follow up with cardiology on a PRN basis    Sherley Rondon MD [facility]  May 3, 2023

## 2023-05-03 NOTE — PROGRESS NOTES
SUBJECTIVE:   CC: Arsalan is an 37 year old who presents for preventative health visit.       5/3/2023     8:59 AM   Additional Questions   Roomed by Lily Puri     Patient has been advised of split billing requirements and indicates understanding: Yes  Healthy Habits:     Getting at least 3 servings of Calcium per day:  Yes    Bi-annual eye exam:  NO    Dental care twice a year:  NO    Sleep apnea or symptoms of sleep apnea:  Daytime drowsiness    Diet:  Regular (no restrictions)    Frequency of exercise:  2-3 days/week    Duration of exercise:  30-45 minutes    Taking medications regularly:  Yes    Medication side effects:  Not applicable    PHQ-2 Total Score: 0    Additional concerns today:  No          Today's PHQ-2 Score:       5/2/2023     3:20 PM   PHQ-2 ( 1999 [employer])   Q1: Little interest or pleasure in doing things 0   Q2: Feeling down, depressed or hopeless 0   PHQ-2 Score 0   Q1: Little interest or pleasure in doing things Not at all   Q2: Feeling down, depressed or hopeless Not at all   PHQ-2 Score 0       Have you ever done Advance Care Planning? (For example, a Health Directive, POLST, or a discussion with a medical provider or your loved ones about your wishes): No, advance care planning information given to patient to review.  Advanced care planning was discussed at today's visit.    Social History     Tobacco Use     Smoking status: Former     Packs/day: 0.00     Years: 10.00     Pack years: 0.00     Types: Cigarettes     Quit date: 4/24/2020     Years since quitting: 3.0     Smokeless tobacco: Never   Vaping Use     Vaping status: Never Used   Substance Use Topics     Alcohol use: No             5/2/2023     3:20 PM   Alcohol Use   Prescreen: >3 drinks/day or >7 drinks/week? No       Last PSA: No results found for: PSA    Reviewed orders with patient. Reviewed health maintenance and updated orders accordingly - Yes  Lab work is in process    Reviewed and updated as needed this visit by  "clinical staff   Tobacco  Allergies  Meds              Reviewed and updated as needed this visit by Provider                 Past Medical History:   Diagnosis Date     Hypertension       Past Surgical History:   Procedure Laterality Date     ABDOMEN SURGERY  2005       Review of Systems   Constitutional: Negative for chills and fever.   HENT: Negative for congestion, ear pain, hearing loss and sore throat.    Eyes: Positive for pain. Negative for visual disturbance.   Respiratory: Negative for cough and shortness of breath.    Cardiovascular: Negative for chest pain, palpitations and peripheral edema.   Gastrointestinal: Negative for abdominal pain, constipation, diarrhea, heartburn, hematochezia and nausea.   Genitourinary: Negative for dysuria, frequency, genital sores, hematuria, impotence, penile discharge and urgency.   Musculoskeletal: Negative for arthralgias, joint swelling and myalgias.   Skin: Positive for rash.   Neurological: Positive for dizziness and headaches. Negative for weakness and paresthesias.   Psychiatric/Behavioral: Negative for mood changes. The patient is not nervous/anxious.        OBJECTIVE:   /84   Pulse 66   Temp 97.8  F (36.6  C) (Temporal)   Resp 16   Ht 1.854 m (6' 1\")   Wt 118.4 kg (261 lb)   SpO2 97%   BMI 34.43 kg/m      Physical Exam  GENERAL: healthy, alert and no distress  EYES: Eyes grossly normal to inspection, PERRL and conjunctivae and sclerae normal  HENT: ear canals and TM's normal, nose and mouth without ulcers or lesions  NECK: no adenopathy, no asymmetry, masses, or scars and thyroid normal to palpation  RESP: lungs clear to auscultation - no rales, rhonchi or wheezes  CV: regular rate and rhythm, normal S1 S2, no S3 or S4, no murmur, click or rub, no peripheral edema and peripheral pulses strong  ABDOMEN: soft, nontender, no hepatosplenomegaly, no masses and bowel sounds normal  MS: no gross musculoskeletal defects noted, no edema  SKIN: right dorsal " hand and forearm with 3 small demarcated light red lesion  NEURO: Normal strength and tone, mentation intact and speech normal  PSYCH: mentation appears normal, affect normal/bright      ASSESSMENT/PLAN:   Arsalan was seen today for physical.    Diagnoses and all orders for this visit:    Routine general medical examination at a health care facility  Diet and exercise discussed.     Hypertension, unspecified type  Stable.     Screening for hyperlipidemia    Palpitations  SVT (supraventricular tachycardia) (H)  First degree heart block  Largely asymptomatic now with rare symptoms. Does have upcoming follow up with cardiology today.     Rash  Considered fixed drug but small and no medication changes. No other new exposures or contacts  -     triamcinolone (KENALOG) 0.1 % external cream; Apply topically 2 times daily    Migraines  Stable with Topamax but does note increase this week. Previous imaging with lesions noted on MRI in  thought to be migraine related. No other focal deficits or new symptoms. Continues Imitrex as needed. Let me know if symptoms worsening.     Patient has been advised of split billing requirements and indicates understanding: Yes      COUNSELING:   Reviewed preventive health counseling, as reflected in patient instructions       Regular exercise       Healthy diet/nutrition       Vision screening       Hearing screening       Immunizations       Aspirin prophylaxis        Alcohol Use        Safe sex practices/STD prevention       Consider Hep C screening for all patients one time for ages 18-79 years       HIV screeninx in teen years, 1x in adult years, and at intervals if high risk       Colorectal cancer screening       Prostate cancer screening        He reports that he quit smoking about 3 years ago. His smoking use included cigarettes. He has never used smokeless tobacco.            Raman Hernandez MD  [facility]

## 2023-05-03 NOTE — PATIENT INSTRUCTIONS
-Schedule echocardiogram  -Cut back (or eliminate) caffeine  -Continue efforts in eating a heart healthy diet, regular exercise  -Increase water intake/electrolyte containing beverages in place of caffeine beverages

## 2023-05-03 NOTE — LETTER
"5/3/2023    Gregorio Persaud MD  [facility] [address] [facility] [address]    RE: Arsalan Mittal       Dear Colleague,     I had the pleasure of seeing Arsalan Mittal in the [facility].  CARDIOLOGY CONSULT    REASON FOR CONSULT: palpitations    PRIMARY CARE PHYSICIAN:  Gregorio Persaud    HISTORY OF PRESENT ILLNESS:  Mr. Mittal is a 37 year old gentleman with PMH significant for hypertension who presents for the evaluation of palpitations.   Arsalan states he has noted intermittent palpitations occurring for a number of months.  These occur entirely at random, feel like a \"fluttering\" and last just seconds.  He was seen by his primary MD and a 5 day ZIO patch monitor which I personally reviewed was performed which did not show any significant arrhythmias. He did have a very brief episode of PSVT lasting just 5 beats. He also states that he has had some burning chest discomfort that has occurred at random.  He was started on prilosec and those symptoms improved.  He noted significant caffeine intake with an energy drink in the morning plus multiple caffeinated sodas during the day.  He does not drink significant ETOH.  He admits he could be more active but feels well when he is-denies any exertional chest pain, chest discomfort or shortness of breath.     PAST MEDICAL HISTORY:  1.  Hypertension    MEDICATIONS:  Current Outpatient Medications   Medication    candesartan (ATACAND) 8 MG tablet    omeprazole (PRILOSEC) 20 MG DR capsule    SUMAtriptan (IMITREX) 100 MG tablet    topiramate (TOPAMAX) 25 MG tablet     No current facility-administered medications for this visit.       ALLERGIES:  No Known Allergies    SOCIAL HISTORY:  Quit smoking 3 years ago    FAMILY HISTORY:  No family hx of early CAD. He reports his mother and grandparent had mitral valve disease    REVIEW OF SYSTEMS:  A complete ROS was obtained and the pertinent positives are outlined in the history of present illness " above.  The remainder of systems is negative.      PHYSICAL EXAM:                     Vital Signs with Ranges     0 lbs 0 oz    Constitutional: awake, alert, no distress  Eyes: PERRL, sclera nonicteric  ENT: trachea midline  Respiratory: CTAB  Cardiovascular: RRR no m/r/g, no JVD  GI: nondistended, nontender, bowel sounds present  Lymph/Hematologic: no lymphadenopathy  Skin: dry, no rash  Musculoskeletal: good muscle tone, no edema bilaterally  Neurologic: no focal deficits  Neuropsychiatric: appropriate affact    DATA:      ASSESSMENT:  1. Palpitations:  Brief 5 beats of SVT noted on ZIO patch monitor  2. Chest pain: Consistent with reflux; resolved with prilosec treatment    RECOMMENDATIONS:  1. Reviewed results of zio patch monitor which was overall reassuring.  Will obtain an echocardiogram to exclude underlying structural heart disease.  BMP/TSH/CBC previously unremarkable.  Discussed the role of caffeine in exacerbating palpitations and encouraged him to significantly cut back or eliminate caffeine.  2.  If echocardiogram is unremarkable, follow up with cardiology on a PRN basis    Sherley Rondon MD [facility]  May 3, 2023          Thank you for allowing me to participate in the care of your patient.      Sincerely,     Sherley Rondon MD     [facility]  cc:   Raman Hernandez MD  [address] [place] [address]

## 2023-05-05 RX ORDER — TOPIRAMATE 25 MG/1
TABLET, FILM COATED ORAL
Status: CANCELLED | OUTPATIENT
Start: 2023-05-05

## 2023-05-05 RX ORDER — CANDESARTAN 8 MG/1
8 TABLET ORAL DAILY
Qty: 90 TABLET | Refills: 3 | Status: SHIPPED | OUTPATIENT
Start: 2023-05-05 | End: 2024-05-19

## 2023-05-05 RX ORDER — CANDESARTAN 8 MG/1
TABLET ORAL
Status: CANCELLED | OUTPATIENT
Start: 2023-05-05

## 2023-05-05 RX ORDER — SUMATRIPTAN 100 MG/1
TABLET, FILM COATED ORAL
Qty: 20 TABLET | Refills: 1 | Status: SHIPPED | OUTPATIENT
Start: 2023-05-05 | End: 2024-02-12

## 2023-05-05 RX ORDER — TOPIRAMATE 25 MG/1
25 TABLET, FILM COATED ORAL DAILY
Qty: 90 TABLET | Refills: 3 | Status: SHIPPED | OUTPATIENT
Start: 2023-05-05 | End: 2023-09-01

## 2023-08-27 ENCOUNTER — MYC MEDICAL ADVICE (OUTPATIENT)
Dept: FAMILY MEDICINE | Facility: CLINIC | Age: 38
End: 2023-08-27
Payer: COMMERCIAL

## 2023-08-27 DIAGNOSIS — G43.709 CHRONIC MIGRAINE WITHOUT AURA WITHOUT STATUS MIGRAINOSUS, NOT INTRACTABLE: ICD-10-CM

## 2023-08-28 NOTE — TELEPHONE ENCOUNTER
Patient visit on 2/8/2023 with Dr. Mary MD medication Topiramate had no dose, route or frequency recorded.  Patient reported medication.  On 5/5/2023, a request for a refill on this medication came to PCP and was filled as follows:     Disp Refills Start End [facility]   topiramate (TOPAMAX) 25 MG tablet 90 tablet 3 5/5/2023  No   Sig - Route: Take 1 tablet (25 mg) by mouth daily - Oral       Will send to PCP for review of My chart refill question on this medication.    Joy Montanez RN

## 2023-09-01 RX ORDER — TOPIRAMATE 50 MG/1
50 TABLET, FILM COATED ORAL DAILY
Qty: 90 TABLET | Refills: 1 | Status: SHIPPED | OUTPATIENT
Start: 2023-09-01 | End: 2024-03-19

## 2023-09-01 NOTE — TELEPHONE ENCOUNTER
Patient states he is taking topiramate 2 tablets day.    Please advise on prescription and dose.    Prescription pended for 2 tablets daily if appropriate.

## 2023-10-23 DIAGNOSIS — K21.9 GASTROESOPHAGEAL REFLUX DISEASE WITHOUT ESOPHAGITIS: ICD-10-CM

## 2024-02-12 DIAGNOSIS — G43.709 CHRONIC MIGRAINE WITHOUT AURA WITHOUT STATUS MIGRAINOSUS, NOT INTRACTABLE: ICD-10-CM

## 2024-02-14 RX ORDER — SUMATRIPTAN 100 MG/1
TABLET, FILM COATED ORAL
Qty: 20 TABLET | Refills: 1 | Status: SHIPPED | OUTPATIENT
Start: 2024-02-14

## 2024-03-17 DIAGNOSIS — G43.709 CHRONIC MIGRAINE WITHOUT AURA WITHOUT STATUS MIGRAINOSUS, NOT INTRACTABLE: ICD-10-CM

## 2024-03-19 RX ORDER — TOPIRAMATE 50 MG/1
50 TABLET, FILM COATED ORAL DAILY
Qty: 90 TABLET | Refills: 1 | Status: SHIPPED | OUTPATIENT
Start: 2024-03-19

## 2024-04-03 ENCOUNTER — PATIENT OUTREACH (OUTPATIENT)
Dept: CARE COORDINATION | Facility: CLINIC | Age: 39
End: 2024-04-03
Payer: COMMERCIAL

## 2024-04-17 ENCOUNTER — PATIENT OUTREACH (OUTPATIENT)
Dept: CARE COORDINATION | Facility: CLINIC | Age: 39
End: 2024-04-17
Payer: COMMERCIAL

## 2024-05-19 ENCOUNTER — MYC REFILL (OUTPATIENT)
Dept: FAMILY MEDICINE | Facility: CLINIC | Age: 39
End: 2024-05-19
Payer: COMMERCIAL

## 2024-05-19 DIAGNOSIS — I10 HYPERTENSION, UNSPECIFIED TYPE: ICD-10-CM

## 2024-05-19 DIAGNOSIS — K21.9 GASTROESOPHAGEAL REFLUX DISEASE WITHOUT ESOPHAGITIS: ICD-10-CM

## 2024-05-20 DIAGNOSIS — K21.9 GASTROESOPHAGEAL REFLUX DISEASE WITHOUT ESOPHAGITIS: ICD-10-CM

## 2024-05-21 RX ORDER — CANDESARTAN 8 MG/1
8 TABLET ORAL DAILY
Qty: 90 TABLET | Refills: 3 | Status: SHIPPED | OUTPATIENT
Start: 2024-05-21

## 2024-06-23 ENCOUNTER — HEALTH MAINTENANCE LETTER (OUTPATIENT)
Age: 39
End: 2024-06-23

## 2024-10-21 ASSESSMENT — PAIN SCALES - PAIN ENJOYMENT GENERAL ACTIVITY SCALE (PEG)
INTERFERED_GENERAL_ACTIVITY: 7
INTERFERED_GENERAL_ACTIVITY: 7
PEG_TOTALSCORE: 5.67
AVG_PAIN_PASTWEEK: 3
INTERFERED_ENJOYMENT_LIFE: 7
PEG_TOTALSCORE: 5.67
AVG_PAIN_PASTWEEK: 3
INTERFERED_ENJOYMENT_LIFE: 7

## 2024-10-21 ASSESSMENT — ANXIETY QUESTIONNAIRES
1. FEELING NERVOUS, ANXIOUS, OR ON EDGE: NOT AT ALL
8. IF YOU CHECKED OFF ANY PROBLEMS, HOW DIFFICULT HAVE THESE MADE IT FOR YOU TO DO YOUR WORK, TAKE CARE OF THINGS AT HOME, OR GET ALONG WITH OTHER PEOPLE?: NOT DIFFICULT AT ALL
5. BEING SO RESTLESS THAT IT IS HARD TO SIT STILL: NOT AT ALL
4. TROUBLE RELAXING: NOT AT ALL
GAD7 TOTAL SCORE: 0
7. FEELING AFRAID AS IF SOMETHING AWFUL MIGHT HAPPEN: NOT AT ALL
6. BECOMING EASILY ANNOYED OR IRRITABLE: NOT AT ALL
IF YOU CHECKED OFF ANY PROBLEMS ON THIS QUESTIONNAIRE, HOW DIFFICULT HAVE THESE PROBLEMS MADE IT FOR YOU TO DO YOUR WORK, TAKE CARE OF THINGS AT HOME, OR GET ALONG WITH OTHER PEOPLE: NOT DIFFICULT AT ALL
GAD7 TOTAL SCORE: 0
2. NOT BEING ABLE TO STOP OR CONTROL WORRYING: NOT AT ALL
GAD7 TOTAL SCORE: 0
3. WORRYING TOO MUCH ABOUT DIFFERENT THINGS: NOT AT ALL
7. FEELING AFRAID AS IF SOMETHING AWFUL MIGHT HAPPEN: NOT AT ALL

## 2024-10-21 ASSESSMENT — PATIENT HEALTH QUESTIONNAIRE - PHQ9
SUM OF ALL RESPONSES TO PHQ QUESTIONS 1-9: 0
SUM OF ALL RESPONSES TO PHQ QUESTIONS 1-9: 0
10. IF YOU CHECKED OFF ANY PROBLEMS, HOW DIFFICULT HAVE THESE PROBLEMS MADE IT FOR YOU TO DO YOUR WORK, TAKE CARE OF THINGS AT HOME, OR GET ALONG WITH OTHER PEOPLE: NOT DIFFICULT AT ALL

## 2024-10-22 ENCOUNTER — OFFICE VISIT (OUTPATIENT)
Dept: FAMILY MEDICINE | Facility: OTHER | Age: 39
End: 2024-10-22
Payer: COMMERCIAL

## 2024-10-22 ENCOUNTER — ANCILLARY PROCEDURE (OUTPATIENT)
Dept: GENERAL RADIOLOGY | Facility: OTHER | Age: 39
End: 2024-10-22
Attending: PHYSICIAN ASSISTANT
Payer: COMMERCIAL

## 2024-10-22 VITALS
OXYGEN SATURATION: 98 % | SYSTOLIC BLOOD PRESSURE: 130 MMHG | WEIGHT: 260 LBS | RESPIRATION RATE: 16 BRPM | TEMPERATURE: 98 F | BODY MASS INDEX: 35.21 KG/M2 | HEART RATE: 77 BPM | DIASTOLIC BLOOD PRESSURE: 84 MMHG | HEIGHT: 72 IN

## 2024-10-22 DIAGNOSIS — M79.675 PAIN OF TOE OF LEFT FOOT: ICD-10-CM

## 2024-10-22 DIAGNOSIS — M76.72 PERONEAL TENDONITIS, LEFT: Primary | ICD-10-CM

## 2024-10-22 PROBLEM — I10 ESSENTIAL HYPERTENSION: Status: ACTIVE | Noted: 2017-12-21

## 2024-10-22 PROBLEM — K21.9 GASTROESOPHAGEAL REFLUX DISEASE: Status: ACTIVE | Noted: 2022-04-18

## 2024-10-22 PROBLEM — G43.109 MIGRAINE WITH AURA: Status: ACTIVE | Noted: 2024-10-22

## 2024-10-22 PROCEDURE — 99214 OFFICE O/P EST MOD 30 MIN: CPT | Performed by: PHYSICIAN ASSISTANT

## 2024-10-22 PROCEDURE — 73660 X-RAY EXAM OF TOE(S): CPT | Mod: TC | Performed by: RADIOLOGY

## 2024-10-22 RX ORDER — PREDNISONE 20 MG/1
TABLET ORAL
Qty: 20 TABLET | Refills: 0 | Status: ON HOLD | OUTPATIENT
Start: 2024-10-22 | End: 2024-11-07

## 2024-10-22 ASSESSMENT — PAIN SCALES - GENERAL: PAINLEVEL: MILD PAIN (2)

## 2024-10-22 NOTE — PROGRESS NOTES
Assessment & Plan     Peroneal tendonitis, left  Pain of toe of left foot  Patient is a 38 year old male who presents with his spouse to discuss pain in the MTP joint of the left big toe and pain along the posterior left lateral malleolus. Patient estimates that his pain began in March 2024. He cannot recall any specific injury or activity which may have triggered the pain. Initially pain was only in the MTP/left big toe, but over the next 1-2 months began to affect the lateral edge of the foot and has radiating along the peroneal tendon. On exam the patient was able to demonstrate normal AROM, 5/5 strength, tenderness to palpation over the peroneal tendon posterior to the lateral malleolus. Discussed peroneal tendonitis, use of anti-inflammatories and rehab exercises. Also instructed patient to avoid barefoot walking, use of unsupportive footwear. If not improving as expected patient will reach out.   - predniSONE (DELTASONE) 20 MG tablet; Take 3 tabs by mouth daily x 3 days, then 2 tabs daily x 3 days, then 1 tab daily x 3 days, then 1/2 tab daily x 3 days.  - XR Toe Left G/E 2 Views; Future      Subjective   Arsalan is a 38 year old, presenting for the following health issues:  Musculoskeletal Problem (Worsening ankle pain; left ankle)        10/22/2024     4:06 PM   Additional Questions   Roomed by Monserrat MANN   Accompanied by Wife-Koko     Musculoskeletal Problem    History of Present Illness       Reason for visit:  Ankle/foot pain and swelling  Symptom onset:  More than a month  Symptoms include:  Sharp pain in big toe knuckle/ball, swelling and pain by ankle bone  Symptom intensity:  Moderate  Symptom progression:  Worsening  Had these symptoms before:  No  What makes it worse:  Use  What makes it better:  No   He is taking medications regularly.     Would rate his pain level right now as 2/10. This is affecting his left ankle.      Review of Systems  Constitutional, HEENT, cardiovascular, pulmonary, gi and gu  "systems are negative, except as otherwise noted.      Objective    /84   Pulse 77   Temp 98  F (36.7  C) (Temporal)   Resp 16   Ht 1.837 m (6' 0.32\")   Wt 117.9 kg (260 lb)   SpO2 98%   BMI 34.95 kg/m    Body mass index is 34.95 kg/m .  Physical Exam   GENERAL: alert and no distress  RESP: lungs clear to auscultation - no rales, rhonchi or wheezes  CV: regular rate and rhythm, normal S1 S2, no S3 or S4, no murmur, click or rub, no peripheral edema  MS: Normal AROM of left knee/ankle/toes. Normal PROM of these as well. 5/5 strength to resisted flex/ext of the left ankle and toes. No tenderness to pressure over the plantar fascia. Mild tenderness to pressure over the MTP joint of the 1st left toe. Similar tenderness to palpation along the peroneal tendon posterior to the left lateral malleolus.   PSYCH: mentation appears normal, affect normal/bright    XR Toe Left G/E 2 Views    Result Date: 10/22/2024  XR TOE LEFT G/E 2 VIEWS  10/22/2024 4:29 PM HISTORY: pain began in the 1st MTP joint March 2024. Off/on and varying in intensity over past 6 months; Pain of toe of left foot COMPARISON: None     IMPRESSION: No acute fracture is identified. There is normal joint spacing and alignment. Bipartite medial hallux sesamoid. JOSE MLAONEY MD   SYSTEM ID:  [id number]         Signed Electronically by: Bernard Patiño PA-C    "

## 2024-10-28 ENCOUNTER — HOSPITAL ENCOUNTER (OUTPATIENT)
Facility: CLINIC | Age: 39
Setting detail: OBSERVATION
Discharge: HOME | End: 2024-10-28
Attending: EMERGENCY MEDICINE | Admitting: EMERGENCY MEDICINE
Payer: COMMERCIAL

## 2024-10-28 ENCOUNTER — APPOINTMENT (OUTPATIENT)
Dept: CT IMAGING | Facility: CLINIC | Age: 39
End: 2024-10-28
Payer: COMMERCIAL

## 2024-10-28 ENCOUNTER — ANESTHESIA EVENT (OUTPATIENT)
Dept: SURGERY | Facility: CLINIC | Age: 39
End: 2024-10-28
Payer: COMMERCIAL

## 2024-10-28 ENCOUNTER — ANESTHESIA (OUTPATIENT)
Dept: SURGERY | Facility: CLINIC | Age: 39
End: 2024-10-28
Payer: COMMERCIAL

## 2024-10-28 ENCOUNTER — APPOINTMENT (OUTPATIENT)
Dept: GENERAL RADIOLOGY | Facility: CLINIC | Age: 39
End: 2024-10-28
Attending: STUDENT IN AN ORGANIZED HEALTH CARE EDUCATION/TRAINING PROGRAM
Payer: COMMERCIAL

## 2024-10-28 VITALS
DIASTOLIC BLOOD PRESSURE: 103 MMHG | TEMPERATURE: 98.2 F | OXYGEN SATURATION: 98 % | WEIGHT: 263.3 LBS | HEART RATE: 67 BPM | RESPIRATION RATE: 16 BRPM | SYSTOLIC BLOOD PRESSURE: 160 MMHG | BODY MASS INDEX: 35.39 KG/M2

## 2024-10-28 DIAGNOSIS — N13.5 URETEROPELVIC JUNCTION OBSTRUCTION: ICD-10-CM

## 2024-10-28 DIAGNOSIS — N13.5 URETEROPELVIC JUNCTION (UPJ) OBSTRUCTION, RIGHT: Primary | ICD-10-CM

## 2024-10-28 DIAGNOSIS — K21.9 GASTROESOPHAGEAL REFLUX DISEASE WITHOUT ESOPHAGITIS: ICD-10-CM

## 2024-10-28 DIAGNOSIS — Q62.11 HYDRONEPHROSIS WITH URETEROPELVIC JUNCTION (UPJ) OBSTRUCTION: Primary | ICD-10-CM

## 2024-10-28 LAB
ALBUMIN SERPL BCG-MCNC: 4.4 G/DL (ref 3.5–5.2)
ALBUMIN UR-MCNC: NEGATIVE MG/DL
ALP SERPL-CCNC: 81 U/L (ref 40–150)
ALT SERPL W P-5'-P-CCNC: 42 U/L (ref 0–70)
ANION GAP SERPL CALCULATED.3IONS-SCNC: 11 MMOL/L (ref 7–15)
APPEARANCE UR: CLEAR
AST SERPL W P-5'-P-CCNC: 17 U/L (ref 0–45)
BASOPHILS # BLD AUTO: 0.1 10E3/UL (ref 0–0.2)
BASOPHILS NFR BLD AUTO: 0 %
BILIRUB SERPL-MCNC: 0.5 MG/DL
BILIRUB UR QL STRIP: NEGATIVE
BUN SERPL-MCNC: 15.7 MG/DL (ref 6–20)
CALCIUM SERPL-MCNC: 9.8 MG/DL (ref 8.8–10.4)
CHLORIDE SERPL-SCNC: 104 MMOL/L (ref 98–107)
COLOR UR AUTO: ABNORMAL
CREAT SERPL-MCNC: 1.15 MG/DL (ref 0.67–1.17)
EGFRCR SERPLBLD CKD-EPI 2021: 84 ML/MIN/1.73M2
EOSINOPHIL # BLD AUTO: 0.1 10E3/UL (ref 0–0.7)
EOSINOPHIL NFR BLD AUTO: 0 %
ERYTHROCYTE [DISTWIDTH] IN BLOOD BY AUTOMATED COUNT: 12.9 % (ref 10–15)
GLUCOSE SERPL-MCNC: 107 MG/DL (ref 70–99)
GLUCOSE UR STRIP-MCNC: NEGATIVE MG/DL
HCO3 SERPL-SCNC: 26 MMOL/L (ref 22–29)
HCT VFR BLD AUTO: 47.2 % (ref 40–53)
HGB BLD-MCNC: 16.1 G/DL (ref 13.3–17.7)
HGB UR QL STRIP: ABNORMAL
IMM GRANULOCYTES # BLD: 0.1 10E3/UL
IMM GRANULOCYTES NFR BLD: 1 %
KETONES UR STRIP-MCNC: NEGATIVE MG/DL
LEUKOCYTE ESTERASE UR QL STRIP: NEGATIVE
LIPASE SERPL-CCNC: 18 U/L (ref 13–60)
LYMPHOCYTES # BLD AUTO: 4 10E3/UL (ref 0.8–5.3)
LYMPHOCYTES NFR BLD AUTO: 24 %
MCH RBC QN AUTO: 29.8 PG (ref 26.5–33)
MCHC RBC AUTO-ENTMCNC: 34.1 G/DL (ref 31.5–36.5)
MCV RBC AUTO: 87 FL (ref 78–100)
MONOCYTES # BLD AUTO: 1 10E3/UL (ref 0–1.3)
MONOCYTES NFR BLD AUTO: 6 %
NEUTROPHILS # BLD AUTO: 11.3 10E3/UL (ref 1.6–8.3)
NEUTROPHILS NFR BLD AUTO: 68 %
NITRATE UR QL: NEGATIVE
NRBC # BLD AUTO: 0 10E3/UL
NRBC BLD AUTO-RTO: 0 /100
PH UR STRIP: 5.5 [PH] (ref 5–7)
PLATELET # BLD AUTO: 251 10E3/UL (ref 150–450)
POTASSIUM SERPL-SCNC: 3.6 MMOL/L (ref 3.4–5.3)
PROT SERPL-MCNC: 7.3 G/DL (ref 6.4–8.3)
RBC # BLD AUTO: 5.41 10E6/UL (ref 4.4–5.9)
RBC URINE: 71 /HPF
SODIUM SERPL-SCNC: 141 MMOL/L (ref 135–145)
SP GR UR STRIP: 1.02 (ref 1–1.03)
SQUAMOUS EPITHELIAL: <1 /HPF
UROBILINOGEN UR STRIP-MCNC: NORMAL MG/DL
WBC # BLD AUTO: 16.6 10E3/UL (ref 4–11)
WBC URINE: 0 /HPF

## 2024-10-28 PROCEDURE — C1769 GUIDE WIRE: HCPCS | Performed by: STUDENT IN AN ORGANIZED HEALTH CARE EDUCATION/TRAINING PROGRAM

## 2024-10-28 PROCEDURE — 250N000011 HC RX IP 250 OP 636: Performed by: STUDENT IN AN ORGANIZED HEALTH CARE EDUCATION/TRAINING PROGRAM

## 2024-10-28 PROCEDURE — 99222 1ST HOSP IP/OBS MODERATE 55: CPT | Performed by: PHYSICIAN ASSISTANT

## 2024-10-28 PROCEDURE — 710N000009 HC RECOVERY PHASE 1, LEVEL 1, PER MIN: Performed by: STUDENT IN AN ORGANIZED HEALTH CARE EDUCATION/TRAINING PROGRAM

## 2024-10-28 PROCEDURE — 258N000003 HC RX IP 258 OP 636: Performed by: NURSE ANESTHETIST, CERTIFIED REGISTERED

## 2024-10-28 PROCEDURE — 258N000001 HC RX 258: Performed by: STUDENT IN AN ORGANIZED HEALTH CARE EDUCATION/TRAINING PROGRAM

## 2024-10-28 PROCEDURE — 370N000017 HC ANESTHESIA TECHNICAL FEE, PER MIN: Performed by: STUDENT IN AN ORGANIZED HEALTH CARE EDUCATION/TRAINING PROGRAM

## 2024-10-28 PROCEDURE — 52332 CYSTOSCOPY AND TREATMENT: CPT | Mod: RT | Performed by: STUDENT IN AN ORGANIZED HEALTH CARE EDUCATION/TRAINING PROGRAM

## 2024-10-28 PROCEDURE — 84155 ASSAY OF PROTEIN SERUM: CPT

## 2024-10-28 PROCEDURE — 52332 CYSTOSCOPY AND TREATMENT: CPT | Performed by: NURSE ANESTHETIST, CERTIFIED REGISTERED

## 2024-10-28 PROCEDURE — 99207 PR APP CREDIT; MD BILLING SHARED VISIT: CPT | Performed by: PHYSICIAN ASSISTANT

## 2024-10-28 PROCEDURE — 250N000011 HC RX IP 250 OP 636

## 2024-10-28 PROCEDURE — 99285 EMERGENCY DEPT VISIT HI MDM: CPT | Mod: 25

## 2024-10-28 PROCEDURE — 52332 CYSTOSCOPY AND TREATMENT: CPT | Performed by: ANESTHESIOLOGY

## 2024-10-28 PROCEDURE — 74420 UROGRAPHY RTRGR +-KUB: CPT | Mod: 26 | Performed by: STUDENT IN AN ORGANIZED HEALTH CARE EDUCATION/TRAINING PROGRAM

## 2024-10-28 PROCEDURE — 250N000009 HC RX 250: Performed by: NURSE ANESTHETIST, CERTIFIED REGISTERED

## 2024-10-28 PROCEDURE — C1758 CATHETER, URETERAL: HCPCS | Performed by: STUDENT IN AN ORGANIZED HEALTH CARE EDUCATION/TRAINING PROGRAM

## 2024-10-28 PROCEDURE — 360N000075 HC SURGERY LEVEL 2, PER MIN: Performed by: STUDENT IN AN ORGANIZED HEALTH CARE EDUCATION/TRAINING PROGRAM

## 2024-10-28 PROCEDURE — C2617 STENT, NON-COR, TEM W/O DEL: HCPCS | Performed by: STUDENT IN AN ORGANIZED HEALTH CARE EDUCATION/TRAINING PROGRAM

## 2024-10-28 PROCEDURE — 74176 CT ABD & PELVIS W/O CONTRAST: CPT

## 2024-10-28 PROCEDURE — 96374 THER/PROPH/DIAG INJ IV PUSH: CPT

## 2024-10-28 PROCEDURE — 82947 ASSAY GLUCOSE BLOOD QUANT: CPT

## 2024-10-28 PROCEDURE — 999N000179 XR SURGERY CARM FLUORO LESS THAN 5 MIN W STILLS: Mod: TC

## 2024-10-28 PROCEDURE — G0378 HOSPITAL OBSERVATION PER HR: HCPCS

## 2024-10-28 PROCEDURE — 255N000002 HC RX 255 OP 636: Performed by: STUDENT IN AN ORGANIZED HEALTH CARE EDUCATION/TRAINING PROGRAM

## 2024-10-28 PROCEDURE — 99222 1ST HOSP IP/OBS MODERATE 55: CPT | Mod: FS | Performed by: PHYSICIAN ASSISTANT

## 2024-10-28 PROCEDURE — 250N000025 HC SEVOFLURANE, PER MIN: Performed by: STUDENT IN AN ORGANIZED HEALTH CARE EDUCATION/TRAINING PROGRAM

## 2024-10-28 PROCEDURE — 85004 AUTOMATED DIFF WBC COUNT: CPT

## 2024-10-28 PROCEDURE — 83690 ASSAY OF LIPASE: CPT

## 2024-10-28 PROCEDURE — 250N000013 HC RX MED GY IP 250 OP 250 PS 637: Performed by: PHYSICIAN ASSISTANT

## 2024-10-28 PROCEDURE — 96375 TX/PRO/DX INJ NEW DRUG ADDON: CPT

## 2024-10-28 PROCEDURE — 250N000011 HC RX IP 250 OP 636: Performed by: NURSE ANESTHETIST, CERTIFIED REGISTERED

## 2024-10-28 PROCEDURE — 36415 COLL VENOUS BLD VENIPUNCTURE: CPT

## 2024-10-28 PROCEDURE — 99207 PR NO BILLABLE SERVICE THIS VISIT: CPT | Performed by: PHYSICIAN ASSISTANT

## 2024-10-28 PROCEDURE — 999N000141 HC STATISTIC PRE-PROCEDURE NURSING ASSESSMENT: Performed by: STUDENT IN AN ORGANIZED HEALTH CARE EDUCATION/TRAINING PROGRAM

## 2024-10-28 PROCEDURE — 272N000001 HC OR GENERAL SUPPLY STERILE: Performed by: STUDENT IN AN ORGANIZED HEALTH CARE EDUCATION/TRAINING PROGRAM

## 2024-10-28 PROCEDURE — 81001 URINALYSIS AUTO W/SCOPE: CPT

## 2024-10-28 DEVICE — URETERAL STENT
Type: IMPLANTABLE DEVICE | Site: URETER | Status: NON-FUNCTIONAL
Brand: POLARIS™ ULTRA
Removed: 2024-11-07

## 2024-10-28 RX ORDER — OXYCODONE HYDROCHLORIDE 5 MG/1
5 TABLET ORAL EVERY 4 HOURS PRN
Status: DISCONTINUED | OUTPATIENT
Start: 2024-10-28 | End: 2024-10-28 | Stop reason: HOSPADM

## 2024-10-28 RX ORDER — NALOXONE HYDROCHLORIDE 0.4 MG/ML
0.4 INJECTION, SOLUTION INTRAMUSCULAR; INTRAVENOUS; SUBCUTANEOUS
Status: DISCONTINUED | OUTPATIENT
Start: 2024-10-28 | End: 2024-10-28 | Stop reason: HOSPADM

## 2024-10-28 RX ORDER — NALOXONE HYDROCHLORIDE 0.4 MG/ML
0.1 INJECTION, SOLUTION INTRAMUSCULAR; INTRAVENOUS; SUBCUTANEOUS
Status: DISCONTINUED | OUTPATIENT
Start: 2024-10-28 | End: 2024-10-28 | Stop reason: HOSPADM

## 2024-10-28 RX ORDER — DEXAMETHASONE SODIUM PHOSPHATE 4 MG/ML
INJECTION, SOLUTION INTRA-ARTICULAR; INTRALESIONAL; INTRAMUSCULAR; INTRAVENOUS; SOFT TISSUE PRN
Status: DISCONTINUED | OUTPATIENT
Start: 2024-10-28 | End: 2024-10-28

## 2024-10-28 RX ORDER — PANTOPRAZOLE SODIUM 40 MG/1
40 TABLET, DELAYED RELEASE ORAL 2 TIMES DAILY
Status: DISCONTINUED | OUTPATIENT
Start: 2024-10-29 | End: 2024-10-28 | Stop reason: HOSPADM

## 2024-10-28 RX ORDER — ONDANSETRON 2 MG/ML
INJECTION INTRAMUSCULAR; INTRAVENOUS PRN
Status: DISCONTINUED | OUTPATIENT
Start: 2024-10-28 | End: 2024-10-28

## 2024-10-28 RX ORDER — HYDROMORPHONE HCL IN WATER/PF 6 MG/30 ML
0.2 PATIENT CONTROLLED ANALGESIA SYRINGE INTRAVENOUS EVERY 5 MIN PRN
Status: DISCONTINUED | OUTPATIENT
Start: 2024-10-28 | End: 2024-10-28 | Stop reason: HOSPADM

## 2024-10-28 RX ORDER — LABETALOL HYDROCHLORIDE 5 MG/ML
10 INJECTION, SOLUTION INTRAVENOUS
Status: DISCONTINUED | OUTPATIENT
Start: 2024-10-28 | End: 2024-10-28 | Stop reason: HOSPADM

## 2024-10-28 RX ORDER — ONDANSETRON 4 MG/1
4 TABLET, ORALLY DISINTEGRATING ORAL EVERY 30 MIN PRN
Status: DISCONTINUED | OUTPATIENT
Start: 2024-10-28 | End: 2024-10-28 | Stop reason: HOSPADM

## 2024-10-28 RX ORDER — HYDROMORPHONE HCL IN WATER/PF 6 MG/30 ML
0.4 PATIENT CONTROLLED ANALGESIA SYRINGE INTRAVENOUS EVERY 5 MIN PRN
Status: DISCONTINUED | OUTPATIENT
Start: 2024-10-28 | End: 2024-10-28 | Stop reason: HOSPADM

## 2024-10-28 RX ORDER — TAMSULOSIN HYDROCHLORIDE 0.4 MG/1
0.4 CAPSULE ORAL DAILY
Qty: 30 CAPSULE | Refills: 0 | Status: SHIPPED | OUTPATIENT
Start: 2024-10-28

## 2024-10-28 RX ORDER — VITAMIN B COMPLEX
1 TABLET ORAL DAILY
COMMUNITY

## 2024-10-28 RX ORDER — SODIUM CHLORIDE, SODIUM LACTATE, POTASSIUM CHLORIDE, CALCIUM CHLORIDE 600; 310; 30; 20 MG/100ML; MG/100ML; MG/100ML; MG/100ML
INJECTION, SOLUTION INTRAVENOUS CONTINUOUS PRN
Status: DISCONTINUED | OUTPATIENT
Start: 2024-10-28 | End: 2024-10-28

## 2024-10-28 RX ORDER — FENTANYL CITRATE 0.05 MG/ML
50 INJECTION, SOLUTION INTRAMUSCULAR; INTRAVENOUS EVERY 5 MIN PRN
Status: DISCONTINUED | OUTPATIENT
Start: 2024-10-28 | End: 2024-10-28 | Stop reason: HOSPADM

## 2024-10-28 RX ORDER — FENTANYL CITRATE 0.05 MG/ML
25 INJECTION, SOLUTION INTRAMUSCULAR; INTRAVENOUS EVERY 5 MIN PRN
Status: DISCONTINUED | OUTPATIENT
Start: 2024-10-28 | End: 2024-10-28 | Stop reason: HOSPADM

## 2024-10-28 RX ORDER — ACETAMINOPHEN 325 MG/1
975 TABLET ORAL ONCE
Status: CANCELLED | OUTPATIENT
Start: 2024-10-28 | End: 2024-10-28

## 2024-10-28 RX ORDER — HYDROMORPHONE HYDROCHLORIDE 1 MG/ML
0.3 INJECTION, SOLUTION INTRAMUSCULAR; INTRAVENOUS; SUBCUTANEOUS
Status: DISCONTINUED | OUTPATIENT
Start: 2024-10-28 | End: 2024-10-28 | Stop reason: HOSPADM

## 2024-10-28 RX ORDER — PREDNISONE 20 MG/1
20 TABLET ORAL DAILY
Status: DISCONTINUED | OUTPATIENT
Start: 2024-10-29 | End: 2024-10-28 | Stop reason: HOSPADM

## 2024-10-28 RX ORDER — TOPIRAMATE 50 MG/1
50 TABLET, FILM COATED ORAL EVERY EVENING
Status: DISCONTINUED | OUTPATIENT
Start: 2024-10-28 | End: 2024-10-28 | Stop reason: HOSPADM

## 2024-10-28 RX ORDER — KETOROLAC TROMETHAMINE 15 MG/ML
15 INJECTION, SOLUTION INTRAMUSCULAR; INTRAVENOUS ONCE
Status: DISCONTINUED | OUTPATIENT
Start: 2024-10-28 | End: 2024-10-28

## 2024-10-28 RX ORDER — DEXAMETHASONE SODIUM PHOSPHATE 4 MG/ML
4 INJECTION, SOLUTION INTRA-ARTICULAR; INTRALESIONAL; INTRAMUSCULAR; INTRAVENOUS; SOFT TISSUE
Status: DISCONTINUED | OUTPATIENT
Start: 2024-10-28 | End: 2024-10-28 | Stop reason: HOSPADM

## 2024-10-28 RX ORDER — SODIUM CHLORIDE, SODIUM LACTATE, POTASSIUM CHLORIDE, CALCIUM CHLORIDE 600; 310; 30; 20 MG/100ML; MG/100ML; MG/100ML; MG/100ML
INJECTION, SOLUTION INTRAVENOUS CONTINUOUS
Status: DISCONTINUED | OUTPATIENT
Start: 2024-10-28 | End: 2024-10-28 | Stop reason: HOSPADM

## 2024-10-28 RX ORDER — HYDROMORPHONE HYDROCHLORIDE 1 MG/ML
0.5 INJECTION, SOLUTION INTRAMUSCULAR; INTRAVENOUS; SUBCUTANEOUS
Status: DISCONTINUED | OUTPATIENT
Start: 2024-10-28 | End: 2024-10-28 | Stop reason: HOSPADM

## 2024-10-28 RX ORDER — OXYCODONE HYDROCHLORIDE 5 MG/1
10 TABLET ORAL EVERY 4 HOURS PRN
Status: DISCONTINUED | OUTPATIENT
Start: 2024-10-28 | End: 2024-10-28 | Stop reason: HOSPADM

## 2024-10-28 RX ORDER — NALOXONE HYDROCHLORIDE 0.4 MG/ML
0.2 INJECTION, SOLUTION INTRAMUSCULAR; INTRAVENOUS; SUBCUTANEOUS
Status: DISCONTINUED | OUTPATIENT
Start: 2024-10-28 | End: 2024-10-28 | Stop reason: HOSPADM

## 2024-10-28 RX ORDER — TAMSULOSIN HYDROCHLORIDE 0.4 MG/1
0.4 CAPSULE ORAL DAILY
Status: DISCONTINUED | OUTPATIENT
Start: 2024-10-28 | End: 2024-10-28 | Stop reason: HOSPADM

## 2024-10-28 RX ORDER — ONDANSETRON 2 MG/ML
4 INJECTION INTRAMUSCULAR; INTRAVENOUS EVERY 30 MIN PRN
Status: DISCONTINUED | OUTPATIENT
Start: 2024-10-28 | End: 2024-10-28 | Stop reason: HOSPADM

## 2024-10-28 RX ORDER — IOPAMIDOL 612 MG/ML
INJECTION, SOLUTION INTRAVASCULAR PRN
Status: DISCONTINUED | OUTPATIENT
Start: 2024-10-28 | End: 2024-10-28 | Stop reason: HOSPADM

## 2024-10-28 RX ORDER — LIDOCAINE HYDROCHLORIDE 20 MG/ML
INJECTION, SOLUTION INFILTRATION; PERINEURAL PRN
Status: DISCONTINUED | OUTPATIENT
Start: 2024-10-28 | End: 2024-10-28

## 2024-10-28 RX ORDER — ONDANSETRON 2 MG/ML
4 INJECTION INTRAMUSCULAR; INTRAVENOUS EVERY 30 MIN PRN
Status: DISCONTINUED | OUTPATIENT
Start: 2024-10-28 | End: 2024-10-28

## 2024-10-28 RX ORDER — FENTANYL CITRATE 50 UG/ML
INJECTION, SOLUTION INTRAMUSCULAR; INTRAVENOUS PRN
Status: DISCONTINUED | OUTPATIENT
Start: 2024-10-28 | End: 2024-10-28

## 2024-10-28 RX ORDER — CEFAZOLIN SODIUM/WATER 2 G/20 ML
2 SYRINGE (ML) INTRAVENOUS EVERY 8 HOURS
Status: DISCONTINUED | OUTPATIENT
Start: 2024-10-28 | End: 2024-10-28 | Stop reason: HOSPADM

## 2024-10-28 RX ORDER — VALSARTAN 40 MG/1
80 TABLET ORAL DAILY
Status: DISCONTINUED | OUTPATIENT
Start: 2024-10-29 | End: 2024-10-28 | Stop reason: HOSPADM

## 2024-10-28 RX ORDER — PROPOFOL 10 MG/ML
INJECTION, EMULSION INTRAVENOUS PRN
Status: DISCONTINUED | OUTPATIENT
Start: 2024-10-28 | End: 2024-10-28

## 2024-10-28 RX ADMIN — Medication 2 G: at 16:53

## 2024-10-28 RX ADMIN — FENTANYL CITRATE 50 MCG: 50 INJECTION INTRAMUSCULAR; INTRAVENOUS at 16:59

## 2024-10-28 RX ADMIN — TOPIRAMATE 50 MG: 50 TABLET, FILM COATED ORAL at 19:04

## 2024-10-28 RX ADMIN — PROPOFOL 200 MG: 10 INJECTION, EMULSION INTRAVENOUS at 16:57

## 2024-10-28 RX ADMIN — SODIUM CHLORIDE, POTASSIUM CHLORIDE, SODIUM LACTATE AND CALCIUM CHLORIDE: 600; 310; 30; 20 INJECTION, SOLUTION INTRAVENOUS at 16:53

## 2024-10-28 RX ADMIN — MIDAZOLAM 2 MG: 1 INJECTION INTRAMUSCULAR; INTRAVENOUS at 16:53

## 2024-10-28 RX ADMIN — KETOROLAC TROMETHAMINE 15 MG: 15 INJECTION, SOLUTION INTRAMUSCULAR; INTRAVENOUS at 08:34

## 2024-10-28 RX ADMIN — ONDANSETRON 4 MG: 2 INJECTION, SOLUTION INTRAMUSCULAR; INTRAVENOUS at 08:34

## 2024-10-28 RX ADMIN — LIDOCAINE HYDROCHLORIDE 80 MG: 20 INJECTION, SOLUTION INFILTRATION; PERINEURAL at 16:57

## 2024-10-28 RX ADMIN — FENTANYL CITRATE 50 MCG: 50 INJECTION INTRAMUSCULAR; INTRAVENOUS at 16:57

## 2024-10-28 RX ADMIN — DEXAMETHASONE SODIUM PHOSPHATE 8 MG: 4 INJECTION, SOLUTION INTRA-ARTICULAR; INTRALESIONAL; INTRAMUSCULAR; INTRAVENOUS; SOFT TISSUE at 17:00

## 2024-10-28 RX ADMIN — ONDANSETRON 4 MG: 2 INJECTION INTRAMUSCULAR; INTRAVENOUS at 17:05

## 2024-10-28 RX ADMIN — TAMSULOSIN HYDROCHLORIDE 0.4 MG: 0.4 CAPSULE ORAL at 19:04

## 2024-10-28 RX ADMIN — PROPOFOL 100 MG: 10 INJECTION, EMULSION INTRAVENOUS at 16:58

## 2024-10-28 ASSESSMENT — COLUMBIA-SUICIDE SEVERITY RATING SCALE - C-SSRS
2. HAVE YOU ACTUALLY HAD ANY THOUGHTS OF KILLING YOURSELF IN THE PAST MONTH?: NO
1. IN THE PAST MONTH, HAVE YOU WISHED YOU WERE DEAD OR WISHED YOU COULD GO TO SLEEP AND NOT WAKE UP?: NO
6. HAVE YOU EVER DONE ANYTHING, STARTED TO DO ANYTHING, OR PREPARED TO DO ANYTHING TO END YOUR LIFE?: NO

## 2024-10-28 ASSESSMENT — ACTIVITIES OF DAILY LIVING (ADL)
ADLS_ACUITY_SCORE: 0

## 2024-10-28 ASSESSMENT — LIFESTYLE VARIABLES: TOBACCO_USE: 1

## 2024-10-28 NOTE — ANESTHESIA CARE TRANSFER NOTE
Patient: Arsalan Mittal    Procedure: Procedure(s):  CYSTOSCOPY, WITH RETROGRADE PYELOGRAM AND URETERAL STENT REPLACEMENT       Diagnosis: Hydronephrosis with ureteropelvic junction (UPJ) obstruction [Q62.11]  Diagnosis Additional Information: No value filed.    Anesthesia Type:   General     Note:    Oropharynx: oropharynx clear of all foreign objects  Level of Consciousness: awake  Oxygen Supplementation: face mask    Independent Airway: airway patency satisfactory and stable  Dentition: dentition unchanged  Vital Signs Stable: post-procedure vital signs reviewed and stable  Report to RN Given: handoff report given  Patient transferred to: PACU    Handoff Report: Identifed the Patient, Identified the Reponsible Provider, Reviewed the pertinent medical history, Discussed the surgical course, Reviewed Intra-OP anesthesia mangement and issues during anesthesia, Set expectations for post-procedure period and Allowed opportunity for questions and acknowledgement of understanding      Electronically Signed By: KAE Hope CRNA  October 28, 2024  5:23 PM

## 2024-10-28 NOTE — H&P
[facility]    History and Physical - Hospitalist Service       Date of Admission:  10/28/2024    Assessment & Plan      Arsalan Mittal is a 38 year old male with a past medical history significant for migraine, HTN, prior urinary obstruction admitted on 10/28/2024 after presenting with flank pain.     R flank/abdominal pain  Mod-severe right renal pelvocaliectasis   Hx urinary obstruction  Presents with acute onset R flank/abdominal pain with associated nausea.Voiding normally.   In 2005 had right ureteral stent placement for obstruction with subsequent right pyeloplasty, reports no recurrent issues until now.   *CT A/P shows moderate to severe right renal pelvis pelvocaliectasis with abrupt  transition at the right ureterovesical junction where there is ill-defined hyperdensity that could reflect small stones or possibly blood products. Underlying stricture is not excluded.   *UA shows large blood, no overt infection   *WBC elevated 16.6  *Cr 1.15  ED provider spoke with Urology who recommends admission for stenting   --admit to observation   --Urology consulted, appreciate assistance. Plan for stent placement later today   --supportive care with anti emetics, pain medications as needed   --NPO     He may discharge post procedure pending procedural findings*    HTN  Hypertensive in 160s on presentation in the setting of pain   PTA he is on candesartan 8mg daily  --resume candesartan in am pending stable renal function     Hx migraine  Continue PTA topamax    Recent peroneal tendonitis L  On prednisone taper, will re order        Diet: NPO per Anesthesia Guidelines for Procedure/Surgery Except for: Meds    DVT Prophylaxis: Pneumatic Compression Devices  [place] Catheter: Not present  Lines: None     Cardiac Monitoring: None  Code Status:  FULL CODE    Clinically Significant Risk Factors Present on Admission                   # Hypertension: Noted on problem list         # Obesity: Estimated  "body mass index is 34.95 kg/m  as calculated from the following:    Height as of 10/22/24: 1.837 m (6' 0.32\").    Weight as of 10/22/24: 117.9 kg (260 lb).              Disposition Plan     Medically Ready for Discharge: 1-2 days pending timing of procedure and post op course     The patient's care was discussed with Dr. Dunn who agrees with the above plan     Yara Montanez PA-C  Hospitalist Service  [facility]  Securely message with [employer] (more info)  Text page via [facility] Paging/Directory     ______________________________________________________________________    Chief Complaint   Flank pain     History is obtained from the patient    History of Present Illness   Arsalan Mittal is a 38 year old male with a past medical history significant for migraine, HTN, prior urinary obstruction admitted on 10/28/2024 after presenting with flank pain.   Patient reports shortly after waking this am he developed acute onset flank pain radiating around to his RLQ. He has been voiding normally without issue. No dysuria, increased frequency, urgency or retention. No fevers or chills. No nausea or vomiting.   He has history of urinary stenting in 2005 but has not had any issues since that time.   Reports improved pain after toradol in the ED, now starting to come back. He did have some nausea earlier, no vomiting.       Past Medical History    Past Medical History:   Diagnosis Date    Hypertension        Past Surgical History   Past Surgical History:   Procedure Laterality Date    ABDOMEN SURGERY  2005       Prior to Admission Medications   Prior to Admission Medications   Prescriptions Last Dose Informant Patient Reported? Taking?   SUMAtriptan (IMITREX) 100 MG tablet   No No   Sig: Take 1 tablet at onset of headache may repeat once after 2 hrs if headache recurs   candesartan (ATACAND) 8 MG tablet   No No   Sig: Take 1 tablet (8 mg) by mouth daily   omeprazole (PRILOSEC) 20 MG DR capsule   No " No   Sig: Take 1 capsule (20 mg) by mouth 2 times daily   predniSONE (DELTASONE) 20 MG tablet   No No   Sig: Take 3 tabs by mouth daily x 3 days, then 2 tabs daily x 3 days, then 1 tab daily x 3 days, then 1/2 tab daily x 3 days.   topiramate (TOPAMAX) 50 MG tablet   No No   Sig: TAKE ONE TABLET BY MOUTH ONCE DAILY   triamcinolone (KENALOG) 0.1 % external cream   No No   Sig: Apply topically 2 times daily      Facility-Administered Medications: None        Social History   I have reviewed this patient's social history and updated it with pertinent information if needed.  Social History     Tobacco Use    Smoking status: Former     Current packs/day: 0.00     Types: Cigarettes     Quit date: 2010     Years since quittin.5    Smokeless tobacco: Never   Vaping Use    Vaping status: Never Used   Substance Use Topics    Alcohol use: No    Drug use: No        Physical Exam   Temp: 97.9  F (36.6  C) Temp src: Temporal BP: (!) 168/97 Pulse: 61   Resp: 16 SpO2: 98 % O2 Device: None (Room air)    There were no vitals filed for this visit.  Vital Signs with Ranges  Temp:  [97.9  F (36.6  C)] 97.9  F (36.6  C)  Pulse:  [61] 61  Resp:  [16] 16  BP: (168)/(97) 168/97  SpO2:  [98 %] 98 %  No intake/output data recorded.    Constitutional: Alert and oriented, sitting up in bed. Appears comfortable and is appropriately conversant. Non -toxic appearing   ENT: moist mucous membranes  Respiratory: Lungs clear to auscultation bilaterally, no increased work of breathing  Cardiovascular: Regular rate and rhythm  GI:  active bowel sounds, abdomen soft, non-tender to palpation, mild CVA tenderness on right   MSK: moves all four extremities. Normal tone       Medical Decision Making       50 MINUTES SPENT BY ME on the date of service doing chart review, history, exam, documentation & further activities per the note.      Data     I have personally reviewed the following data over the past 24 hrs:    16.6 (H)  \   16.1   / 251      141 104 15.7 /  107 (H)   3.6 26 1.15 \     ALT: 42 AST: 17 AP: 81 TBILI: 0.5   ALB: 4.4 TOT PROTEIN: 7.3 LIPASE: 18       Imaging results reviewed over the past 24 hrs:   Recent Results (from the past 24 hours)   CT Abdomen Pelvis w/o Contrast    Narrative    CT ABDOMEN/PELVIS WITHOUT CONTRAST October 28, 2024 9:05 AM    CLINICAL HISTORY: Right flank pain, right lower quadrant pain.     TECHNIQUE: CT scan of the abdomen and pelvis was performed without IV  contrast. Multiplanar reformats were obtained. Dose reduction  techniques were used.  CONTRAST: None.    COMPARISON: None available.    FINDINGS:   LOWER CHEST: Unremarkable.    HEPATOBILIARY: No significant mass or bile duct dilatation. No  calcified gallstones.     PANCREAS: No significant mass, duct dilatation, or inflammatory  change.    SPLEEN: Borderline splenomegaly.    ADRENAL GLANDS: No significant nodules.    KIDNEYS: Punctate left lower pole nonobstructing calculus. Left  parapelvic cyst; no follow-up necessary. Faint hyperdensity and abrupt  transition at the ureteropelvic junction (6/42) with upstream moderate  to severe pelvocaliectasis.     BOWEL: No obstruction or inflammatory change. Appendix is normal.    VASCULATURE: Nonaneurysmal abdominal aorta.    LYMPH NODE AND PERITONEUM: Multiple scattered subcentimeter  retroperitoneal lymph nodes are nonspecific, possibly reactive. Right  retroperitoneal surgical clips.    PELVIS: Unremarkable.    MUSCULOSKELETAL: No aggressive osseous lesion. Mild degenerative  changes of the spine.    OTHER: None.      Impression    IMPRESSION:   1.  Moderate to severe right renal pelvocaliectasis with abrupt  transition at the right ureterovesical junction where there is  ill-defined hyperdensity that could reflect small stones or possibly  blood products. Underlying stricture is not excluded. Of note, this is  in the region of surgical clips; correlate with prior surgery in this  region.  2.  Punctate left lower  pole nonobstructing calculus.  3.  Borderline splenomegaly.    ELIZABETH CHENEY MD         SYSTEM ID:  [id number]

## 2024-10-28 NOTE — PROGRESS NOTES
RECEIVING UNIT ED HANDOFF REVIEW    ED Nurse Handoff Report was reviewed by: Africa Fallon RN on October 28, 2024 at 1:08 PM

## 2024-10-28 NOTE — ED TRIAGE NOTES
"Pt arrives with R sided flank pain that \"wraps around to my lower abdomen\". Pain 8/10, intermittently. No n/v/d, no pain with urination. Pt states pain started in R flank, but now \"90% of the pain is in my abdomen. Pain onset approximately 0530. Prior hx kidney stents.        "

## 2024-10-28 NOTE — PROGRESS NOTES
Top portion must ALWAYS be completed  PRIMARY Concern:  R flank pain  SAFETY RISK Concerns (fall risk, behaviors, etc.): None      Aggression Tool Color: Green  Isolation/Type: None  Tests/Procedures for NEXT shift:  Cysto with stent  Consults? (Pending/following, signed-off?) Urology following  Where is patient from? (Home, TCU, etc.): Home with spouse  Other Important info for NEXT shift: none  Anticipated DC date & active delays:  pending cysto findings  _____________________________________________________________________________  SUMMARY NOTE:              (either paste note here OR complete the info below- RN to choose one- delete info below if not used)  Orientation/Cognitive: A&OX4  Observation Goals (Met/ Not Met): not met  Mobility Level/Assist Equipment: Ind  Antibiotics & Plan (IV/po, length of tx left):  None  Pain Management:  Denies pain, had received IV Toradol in the ED  Tele/VS/O2:  VSS on RA  ABNL Lab/BG: WBC up at 16.6  Diet: NPO  Bowel/Bladder:  Continent  Skin Concerns: Intact  Drains/Devices:  PIV Sled  Patient Stated Goal for Today:  to go home  Report called to Pre op RN, stable at time of trx

## 2024-10-28 NOTE — PHARMACY-ADMISSION MEDICATION HISTORY
Pharmacist Admission Medication History    Admission medication history is complete. The information provided in this note is only as accurate as the sources available at the time of the update.    Information Source(s): Patient, Family member, and CareEverywhere/SureScripts via in-person    Pertinent Information: Patient started his prednisone taper on 10/23/24. Today would be the last dose of 40mg daily.     Changes made to PTA medication list:  Added: vitamin D3 and elderberry  Deleted: None  Changed: Triamcinolone cream daily to PRN    Allergies reviewed with patient and updates made in EHR: yes    Medication History Completed By: Karime Harrison RPH 10/28/2024 11:10 AM    PTA Med List   Medication Sig Last Dose/Taking    candesartan (ATACAND) 8 MG tablet Take 1 tablet (8 mg) by mouth daily 10/27/2024 Evening    Elderberry 500 MG CAPS Take 1 capsule by mouth every evening. 10/27/2024 Evening    omeprazole (PRILOSEC) 20 MG DR capsule Take 1 capsule (20 mg) by mouth 2 times daily (Patient taking differently: Take 20 mg by mouth daily.) 10/27/2024 Evening    predniSONE (DELTASONE) 20 MG tablet Take 3 tabs by mouth daily x 3 days, then 2 tabs daily x 3 days, then 1 tab daily x 3 days, then 1/2 tab daily x 3 days. 10/27/2024    SUMAtriptan (IMITREX) 100 MG tablet Take 1 tablet at onset of headache may repeat once after 2 hrs if headache recurs Taking    topiramate (TOPAMAX) 50 MG tablet TAKE ONE TABLET BY MOUTH ONCE DAILY 10/27/2024 Evening    triamcinolone (KENALOG) 0.1 % external cream Apply topically 2 times daily (Patient taking differently: Apply topically 2 times daily as needed.) Taking Differently    Vitamin D3 (VITAMIN D, CHOLECALCIFEROL,) 25 mcg (1000 units) tablet Take 1 tablet by mouth daily. 10/27/2024 Evening

## 2024-10-28 NOTE — PROGRESS NOTES
[facility]  Hospitalist Brief note:    Contacted by discharging ALDO, patient will need Flomax 0.4 ordered for discharge.  I handled.  Refills from PCP or urology    [facility] House Officer/ALDO  Domingo Chinchilla PA-C  [facility]  Securely message with the [employer] Web Console (learn more here)  Text page via [employer] Paging/Directory

## 2024-10-28 NOTE — ANESTHESIA PREPROCEDURE EVALUATION
Anesthesia Pre-Procedure Evaluation    Patient: Arsalan Mittal   MRN: [id number] : 1985        Procedure : Procedure(s):  CYSTOSCOPY, WITH RETROGRADE PYELOGRAM AND URETERAL STENT REPLACEMENT          Past Medical History:   Diagnosis Date    Hypertension       Past Surgical History:   Procedure Laterality Date    ABDOMEN SURGERY        No Known Allergies   Social History     Tobacco Use    Smoking status: Former     Current packs/day: 0.00     Types: Cigarettes     Quit date: 2010     Years since quittin.5    Smokeless tobacco: Never   Substance Use Topics    Alcohol use: No      Wt Readings from Last 1 Encounters:   10/28/24 119.4 kg (263 lb 4.8 oz)        Anesthesia Evaluation            ROS/MED HX  ENT/Pulmonary:     (+)                tobacco use, Past use,                       Neurologic:     (+)      migraines,                          Cardiovascular:     (+)  hypertension- -   -  - -                                      METS/Exercise Tolerance:     Hematologic:       Musculoskeletal: Comment: Recent peroneal tendonitis L  On prednisone taper      GI/Hepatic:     (+) GERD,                   Renal/Genitourinary: Comment: R flank/abdominal pain  Mod-severe right renal pelvocaliectasis   Hx urinary obstruction    (+) renal disease, type: CRI,     Nephrolithiasis ,       Endo:     (+)               Obesity,       Psychiatric/Substance Use:       Infectious Disease:       Malignancy:       Other:            Physical Exam    Airway        Mallampati: II   TM distance: > 3 FB   Neck ROM: full   Mouth opening: > 3 cm    Respiratory Devices and Support         Dental  no notable dental history         Cardiovascular   cardiovascular exam normal          Pulmonary   pulmonary exam normal        breath sounds clear to auscultation           OUTSIDE LABS:  CBC:   Lab Results   Component Value Date    WBC 16.6 (H) 10/28/2024    WBC 10.1 2023    HGB 16.1 10/28/2024    HGB 15.4 2023    HCT  "47.2 10/28/2024    HCT 45.4 02/08/2023     10/28/2024     02/08/2023     BMP:   Lab Results   Component Value Date     10/28/2024     02/08/2023    POTASSIUM 3.6 10/28/2024    POTASSIUM 3.8 02/08/2023    CHLORIDE 104 10/28/2024    CHLORIDE 103 02/08/2023    CO2 26 10/28/2024    CO2 23 02/08/2023    BUN 15.7 10/28/2024    BUN 18.9 02/08/2023    CR 1.15 10/28/2024    CR 1.02 02/08/2023     (H) 10/28/2024     (H) 02/08/2023     COAGS: No results found for: \"PTT\", \"INR\", \"FIBR\"  POC: No results found for: \"BGM\", \"HCG\", \"HCGS\"  HEPATIC:   Lab Results   Component Value Date    ALBUMIN 4.4 10/28/2024    PROTTOTAL 7.3 10/28/2024    ALT 42 10/28/2024    AST 17 10/28/2024    ALKPHOS 81 10/28/2024    BILITOTAL 0.5 10/28/2024     OTHER:   Lab Results   Component Value Date    LAISHA 9.8 10/28/2024    LIPASE 18 10/28/2024    TSH 1.81 02/08/2023       Anesthesia Plan    ASA Status:  2    NPO Status:  NPO Appropriate    Anesthesia Type: General.     - Airway: LMA   Induction: Intravenous.   Maintenance: Balanced.        Consents    Anesthesia Plan(s) and associated risks, benefits, and realistic alternatives discussed. Questions answered and patient/representative(s) expressed understanding.     - Discussed:     - Discussed with:  Patient      - Extended Intubation/Ventilatory Support Discussed: No.      - Patient is DNR/DNI Status: No     Use of blood products discussed: Yes.     - Discussed with: Patient.     - Consented: consented to blood products            Reason for refusal: other.     Postoperative Care    Pain management: IV analgesics, Oral pain medications, Multi-modal analgesia.   PONV prophylaxis: Ondansetron (or other 5HT-3), Dexamethasone or Solumedrol, Background Propofol Infusion     Comments:               Gerardo Willoughby, DO    I have reviewed the pertinent notes and labs in the chart from the past 30 days and (re)examined the patient.  Any updates or changes from those " "notes are reflected in this note.               # Hypertension: Noted on problem list         # Obesity: Estimated body mass index is 35.39 kg/m  as calculated from the following:    Height as of 10/22/24: 1.837 m (6' 0.32\").    Weight as of this encounter: 119.4 kg (263 lb 4.8 oz).             "

## 2024-10-28 NOTE — ED NOTES
"[facility]  ED Nurse Handoff Report    ED Chief complaint: Flank Pain and Abdominal Pain      ED Diagnosis:   Final diagnoses:   Ureteropelvic junction obstruction       Code Status: not addressed at this time    Allergies: No Known Allergies    Patient Story: Pt arrives with R sided flank pain that \"wraps around to my lower abdomen\". Pain 8/10, intermittently. No n/v/d, no pain with urination. Pt states pain started in R flank, but now \"90% of the pain is in my abdomen. Pain onset approximately 0530. Prior hx kidney stents.     Focused Assessment:  patient c/o right side abd pain that radiates around to the back. Patient states that he had surgery on his kidneys back in 2005. Patient denies SOB, fevers and urinary symptoms.  NPO for procedure    Treatments and/or interventions provided: IV, labs, CT scan, zofran and toradol.    Patient's response to treatments and/or interventions: Decrease in pain    To be done/followed up on inpatient unit:  Continue to monitor     Does this patient have any cognitive concerns?:  A/Ox4    Activity level - Baseline/Home:  Independent  Activity Level - Current:   Independent    Patient's Preferred language: English   Needed?: No    Isolation: None  Infection: Not Applicable  Patient tested for COVID 19 prior to admission: NO  Bariatric?: No    Vital Signs:   Vitals:    10/28/24 0801   BP: (!) 168/97   Pulse: 61   Resp: 16   Temp: 97.9  F (36.6  C)   TempSrc: Temporal   SpO2: 98%       Cardiac Rhythm:     Was the PSS-3 completed:   Yes  What interventions are required if any?               Family Comments: NA  OBS brochure/video discussed/provided to patient/family: N/A              Name of person given brochure if not patient: NA              Relationship to patient: NA    For the majority of the shift this patient's behavior was Green.   Behavioral interventions performed were None.    ED NURSE PHONE NUMBER: [phone]         "

## 2024-10-28 NOTE — PROGRESS NOTES
Pt arrived to this unit at ~ 1330. A&o X4. Indep. VSS ex BP slightly high, no s/s of hypertension. Mild pain on R lower back and abd, stated the pain improving. NPO. Will have ureteral stent placement in the afternoon.

## 2024-10-28 NOTE — ANESTHESIA PROCEDURE NOTES
Airway       Patient location during procedure: OR  Staff -        CRNA: Allyn Wilson APRN CRNA       Performed By: CRNA  Consent for Airway        Urgency: elective  Indications and Patient Condition       Indications for airway management: genoveva-procedural         Mask difficulty assessment: 0 - not attempted    Final Airway Details       Final airway type: supraglottic airway    Supraglottic Airway Details        Type: LMA       Brand: I-Gel       LMA size: 5    Post intubation assessment        Placement verified by: capnometry, equal breath sounds and chest rise        Number of attempts at approach: 1       Secured with: pink tape       Ease of procedure: easy       Dentition: Intact and Unchanged

## 2024-10-28 NOTE — CONSULTS
Urology    Name: Arsalan Mittal    MRN: [id number]   YOB: 1985         We were asked to see Arsalan Mittal in consultation from EMILY Montero  for evaluation and treatment of right flank pain.    Consult, and follow          Chief Complaint:   Urinary retention  History is obtained from the patient and EMR.          History of Present Illness:   Arsalan Mittal is a pleasant 38 year old male seen in the ED for right flank pain. He has a hx of laparoscopic right dismembered pyeloplasty in . He was found to have crossing lower pole vessels intimately adherent to the ureteropelvic junction, in fact, encased in the ureteropelvic junction.     He present this AM with significant right flank and RLQ pain this AM. CT noted      UA nitrite neg, WBC 16.6, Cr 1.15. CT noted moderate to severe right renal pelvocaliectasis with abrupt transition at the right ureteropelvic junction where there is ill-defined hyperdensity that could reflect small stones or possibly  blood products.     Pain improved, although continues. Had water at 5am. Afeb, AVSS.            Past Medical History:     Past Medical History:   Diagnosis Date    Hypertension             Past Surgical History:     Past Surgical History:   Procedure Laterality Date    ABDOMEN SURGERY              Social History:     Social History     Tobacco Use    Smoking status: Former     Current packs/day: 0.00     Types: Cigarettes     Quit date: 2010     Years since quittin.5    Smokeless tobacco: Never   Substance Use Topics    Alcohol use: No            Family History:     Family History   Problem Relation Age of Onset    Hypertension Mother             Allergies:     No Known Allergies         Medications:     Current Facility-Administered Medications   Medication Dose Route Frequency Provider Last Rate Last Admin    ondansetron (ZOFRAN) injection 4 mg  4 mg Intravenous Q30 Min PRN David Montero PA-C   4 mg at 10/28/24 0834      Current Outpatient Medications   Medication Sig Dispense Refill    candesartan (ATACAND) 8 MG tablet Take 1 tablet (8 mg) by mouth daily 90 tablet 3    omeprazole (PRILOSEC) 20 MG DR capsule Take 1 capsule (20 mg) by mouth 2 times daily 180 capsule 1    predniSONE (DELTASONE) 20 MG tablet Take 3 tabs by mouth daily x 3 days, then 2 tabs daily x 3 days, then 1 tab daily x 3 days, then 1/2 tab daily x 3 days. 20 tablet 0    SUMAtriptan (IMITREX) 100 MG tablet Take 1 tablet at onset of headache may repeat once after 2 hrs if headache recurs 20 tablet 1    topiramate (TOPAMAX) 50 MG tablet TAKE ONE TABLET BY MOUTH ONCE DAILY 90 tablet 1    triamcinolone (KENALOG) 0.1 % external cream Apply topically 2 times daily 80 g 0             Review of Systems:      ROS: 10 point ROS neg other than the symptoms noted above in the HPI.          Physical Exam:     VS:  T: 97.9    HR: 61    BP: 168/97    RR: 16   GEN:  AOx3.  NAD.  Pleasant.  GEN: NAD, lying in bed  EYES: EOMI  NEURO: AAO          Data:   All laboratory data reviewed:    Recent Labs   Lab 10/28/24  0834   WBC 16.6*   HGB 16.1          Recent Labs   Lab 10/28/24  0834      POTASSIUM 3.6   CHLORIDE 104   CO2 26   BUN 15.7   CR 1.15   *   LAISHA 9.8       Recent Labs   Lab 10/28/24  0828   COLOR Light Yellow   APPEARANCE Clear   URINEGLC Negative   URINEBILI Negative   URINEKETONE Negative   SG 1.025   URINEPH 5.5   PROTEIN Negative   NITRITE Negative   LEUKEST Negative   RBCU 71*   WBCU 0       All pertinent imaging reviewed.  CT ABDOMEN/PELVIS WITHOUT CONTRAST October 28, 2024 9:05 AM     CLINICAL HISTORY: Right flank pain, right lower quadrant pain.     TECHNIQUE: CT scan of the abdomen and pelvis was performed without IV  contrast. Multiplanar reformats were obtained. Dose reduction  techniques were used.  CONTRAST: None.     COMPARISON: None available.     FINDINGS:   LOWER CHEST: Unremarkable.     HEPATOBILIARY: No significant mass or bile  duct dilatation. No  calcified gallstones.      PANCREAS: No significant mass, duct dilatation, or inflammatory  change.     SPLEEN: Borderline splenomegaly.     ADRENAL GLANDS: No significant nodules.     KIDNEYS: Punctate left lower pole nonobstructing calculus. Left  parapelvic cyst; no follow-up necessary. Faint hyperdensity and abrupt  transition at the ureteropelvic junction (6/42) with upstream moderate  to severe pelvocaliectasis.      BOWEL: No obstruction or inflammatory change. Appendix is normal.     VASCULATURE: Nonaneurysmal abdominal aorta.     LYMPH NODE AND PERITONEUM: Multiple scattered subcentimeter  retroperitoneal lymph nodes are nonspecific, possibly reactive. Right  retroperitoneal surgical clips.     PELVIS: Unremarkable.     MUSCULOSKELETAL: No aggressive osseous lesion. Mild degenerative  changes of the spine.     OTHER: None.                                                                      IMPRESSION:   1.  Moderate to severe right renal pelvocaliectasis with abrupt  transition at the right ureterovesical junction where there is  ill-defined hyperdensity that could reflect small stones or possibly  blood products. Underlying stricture is not excluded. Of note, this is  in the region of surgical clips; correlate with prior surgery in this  region.  2.  Punctate left lower pole nonobstructing calculus.  3.  Borderline splenomegaly.         Impression and Plan:   Impression:   Arsalan Mittal is a 38 year old male with recurrent right UPJO      Plan:   -NPO  -To OR later today (timing TBD) for cysto, right ureteral stent placement.   -Follow-up pending OR findings.       Discussed with Dr. Kaplan.     EMILY [facility]  Office: [phone]  After business hours: [phone]

## 2024-10-28 NOTE — OP NOTE
OPERATIVE REPORT    PREOPERATIVE DIAGNOSIS: Right hydronephrosis    POSTOPERATIVE DIAGNOSIS:  Same    PROCEDURES PERFORMED:   1. Cystourethroscopy with right retrograde pyelography  2. Placement of right ureteral stent.  3. Intraoperative interpretation of fluoroscopic imaging.     STAFF SURGEON: Manuela Kaplan MD    ANESTHESIA: General- LMA    ESTIMATED BLOOD LOSS: 0 mL.     DRAINS: Right 6 Fr x 26 cm JJ stent    OPERATIVE INDICATIONS:   Arsalan Mittal is a 38 year old male with a history of congenital right UPJ obstruction who underwent laparoscopic right pyeloplasty with Dr. Major in 2005, who presented to the ER today with right flank pain and was found to have hydronephrosis to the level of the UPJ and some layering material (not a true stone) at the site of his prior repair. The patient was counseled on the alternatives, risks, and benefits and elected to proceed with the above stated procedure for pain control. We will then plan to perform ureteroscopy in 2-3 weeks to evaluate the ureter and repair.    DESCRIPTION OF PROCEDURE:    After informed consent was obtained, the patient was taken to the operating room, and moved to the operating table.  After adequate anesthesia was induced, the patient was repositioned in dorsal lithotomy position and prepped and draped in the usual sterile fashion. A timeout was taken to confirm correct patient, procedure and laterality.     A 22-Danish cystoscope was inserted into a well lubricated urethra. The urethra was unremarkable, and the prostate exhibited moderate lateral lobe hypertrophy.  The bladder was free of tumors, stones or diverticuli.  The media was clear.  Bilateral ureteral orifices were orthotopic.  A Sensor guidewire was advanced into the right renal pelvis with the aid of a 5-Danish open ended ureteral catheter.  A retrograde pyelogram demonstrated what appeared to be a point of narrowing at the UPJ with mild/moderate upstream hydronephrosis.     hydronephrosis or filling defects.  The wire was replaced and a 6 Fr x 26 cm JJ stent was advanced over the guidewire under fluoroscopic guidance with a good curl in the renal pelvis and bladder.  The stent passed up easily with no appreciable resistance.  The bladder was then drained.    The patient tolerated the procedure well.  There were no complications.      PLAN:   - OK to discharge home this evening  - 0.4 mg Flomax daily for stent-related discomfort  - No antibiotics necessary  - Follow-up for ureteroscopy in 2-3 weeks.     Manuela Kaplan MD  Reconstructive Urology  [facility]

## 2024-10-28 NOTE — ED PROVIDER NOTES
Emergency Department Attending Supervision Note  10/28/2024  10:07 AM      I evaluated this patient in conjunction with David Montero PA-C      Briefly, the patient presented with flank and abdominal pain. The patient reported that he developed intermittent right sided flank pain at 0530 this morning. He states that the pain has since migrated and is localized primarily to the right lower quadrant of his abdomen; pain has now become constant. Currently characterizes the aforementioned pain at a severity of 7/10. Denies nausea, vomiting, diarrhea, or dysuria. Further denies fever, as well as testicular or penile pain. Further denies risk of possible STD. The patient notes a history of kidney stents.      On my exam,  General: Alert and cooperative with exam. Patient in mild to moderate distress. Normal mentation.  Head:  Scalp is NC/AT  Eyes:  No scleral icterus, PERRL  ENT:  The external nose and ears are normal. The oropharynx is normal and without erythema; mucus membranes are moist. Uvula midline, no evidence of deep space infection.  Neck:  Normal range of motion without rigidity.  CV:  Regular rate and rhythm    No pathologic murmur   Resp:  Breath sounds are clear bilaterally    Non-labored, no retractions or accessory muscle use  GI:  Abdomen is soft, no distension, no tenderness. No peritoneal signs  MS:  No lower extremity edema   Skin:  Warm and dry, No rash or lesions noted.  Neuro: Oriented x 3. No gross motor deficits.     CT Abdomen Pelvis w/o Contrast   Final Result   IMPRESSION:    1.  Moderate to severe right renal pelvocaliectasis with abrupt   transition at the right ureterovesical junction where there is   ill-defined hyperdensity that could reflect small stones or possibly   blood products. Underlying stricture is not excluded. Of note, this is   in the region of surgical clips; correlate with prior surgery in this   region.   2.  Punctate left lower pole nonobstructing calculus.   3.   Borderline splenomegaly.      ELIZABETH CHENEY MD            SYSTEM ID:  [id number]            My impression:  Arsalan Mittal is a 38 year old male who presents with flank pain. A broad differential diagnosis was considered including diverticulitis, ureterolithiasis, tumor, colitis, cholecystitis, aneurysm, dissection, volvulus, appendicitis, splenic issue, stomach pathology, ulcer, hydronephrosis, pneumonia, rib fracture, UTI, pyelonephritis amongst many other etiologies.  CT imaging demonstrates right renal pelvocaliectasis with potential small kidney stones or possible blood products though underlying stricture is not excluded; patient has required previous stenting in the past.  Imaging findings were discussed with on-call urology who will plan to place ureteral stent later today.  Admitted to hospitalist service.  UA without evidence of infection..  Patients pain is controlled in ED.      Diagnosis    ICD-10-CM    1. Hydronephrosis with ureteropelvic junction (UPJ) obstruction  Q62.11 Case Request: CYSTOSCOPY, WITH RETROGRADE PYELOGRAM AND URETERAL STENT REPLACEMENT     Case Request: CYSTOSCOPY, WITH RETROGRADE PYELOGRAM AND URETERAL STENT REPLACEMENT      2. Ureteropelvic junction obstruction  N13.5       3. Gastroesophageal reflux disease without esophagitis  K21.9         Morgan Vela, Morgan Narvaez DO  10/28/24 1747

## 2024-10-28 NOTE — ED PROVIDER NOTES
Emergency Department Note      History of Present Illness     Chief Complaint   Flank Pain and Abdominal Pain      HPI   Arsalan Mittal is a 38 year old male with history of hypertension, GERD, and migraines with aura who presents with complaint of right flank/RLQ abdominal discomfort.  Patient states pain started approximately 5:15 AM this morning while he was driving to work.  Patient describes pain as an aching sensation, radiates around to RLQ of abdomen and into groin.  Endorses mild nausea without vomiting.  He denies testicular pain or discomfort.  He also denies dysuria, hematuria, urinary urgency or hesitation.  Also denies headache or dizziness, chest pain or shortness of breath, fever, diarrhea or constipation.    Independent Historian   None    Review of External Notes   10/22/2022 ED note reviewed for hemoptysis  1/29/2005 right ureteral stent placement for obstruction.  Subsequently, he underwent laparoscopic right dismembered pyeloplasty and he was found to have crossing lower pole vessels intimately adherent to the ureteropelvic junction, in fact, encased in the ureteropelvic junction.  Dissection of the vessels away and   preservation of these vessels was performed with some difficulty, but   fortunately, successful, and subsequent dismemberment and   reanastomosis of the ureter to the renal pelvis anterior to these vessels   although the tissues were extremely friable.  He was stented.  The stent   was left in place for 6 weeks.   4/12/2005 operative note, removal of right double-pigtail stent from right ureteropelvic junction  Past Medical History     Medical History and Problem List   Past Medical History:   Diagnosis Date    Hypertension        Medications   No current outpatient medications on file.      Surgical History   Past Surgical History:   Procedure Laterality Date    ABDOMEN SURGERY  2005       Physical Exam     Patient Vitals for the past 24 hrs:   BP Temp Temp src Pulse Resp SpO2  Weight   10/28/24 1332 (!) 167/100 98.2  F (36.8  C) Oral 56 16 95 % 119.4 kg (263 lb 4.8 oz)   10/28/24 0801 (!) 168/97 97.9  F (36.6  C) Temporal 61 16 98 % --     Physical Exam  Physical Exam:  GENERAL: Warm, dry, alert, no increased work of breathing, standing in room appearing mildly uncomfortable, speaking full clear sentences  HEENT: PERRL, no scleral icterus, clear conjunctiva, posterior oropharynx clear  NECK: No JVD, supple without lymphadenopathy.  No stiffness or restricted range of motion  HEART: Regular rate and rhythm, no murmur or rubs  LUNGS: CTAB, moving air well.  No crackles or wheezes are heard.  ABD: Tenderness to palpation RLQ without rebound, soft, nondistended, no guarding, with good bowel sounds heard.  BACK: Right side CVAT, no obvious deformities  EXTREMITIES: Moves all extremities without difficulty, no calf tenderness or peripheral edema.  SKIN: Warm and dry without rash or lesions.  NEUROLOGICAL: No focal deficits.   PSYCH: Appropriate mood and affect.     Diagnostics     Lab Results   Labs Ordered and Resulted from Time of ED Arrival to Time of ED Departure   COMPREHENSIVE METABOLIC PANEL - Abnormal       Result Value    Sodium 141      Potassium 3.6      Carbon Dioxide (CO2) 26      Anion Gap 11      Urea Nitrogen 15.7      Creatinine 1.15      GFR Estimate 84      Calcium 9.8      Chloride 104      Glucose 107 (*)     Alkaline Phosphatase 81      AST 17      ALT 42      Protein Total 7.3      Albumin 4.4      Bilirubin Total 0.5     ROUTINE UA WITH MICROSCOPIC REFLEX TO CULTURE - Abnormal    Color Urine Light Yellow      Appearance Urine Clear      Glucose Urine Negative      Bilirubin Urine Negative      Ketones Urine Negative      Specific Gravity Urine 1.025      Blood Urine Large (*)     pH Urine 5.5      Protein Albumin Urine Negative      Urobilinogen Urine Normal      Nitrite Urine Negative      Leukocyte Esterase Urine Negative      RBC Urine 71 (*)     WBC Urine 0       Squamous Epithelials Urine <1     CBC WITH PLATELETS AND DIFFERENTIAL - Abnormal    WBC Count 16.6 (*)     RBC Count 5.41      Hemoglobin 16.1      Hematocrit 47.2      MCV 87      MCH 29.8      MCHC 34.1      RDW 12.9      Platelet Count 251      % Neutrophils 68      % Lymphocytes 24      % Monocytes 6      % Eosinophils 0      % Basophils 0      % Immature Granulocytes 1      NRBCs per 100 WBC 0      Absolute Neutrophils 11.3 (*)     Absolute Lymphocytes 4.0      Absolute Monocytes 1.0      Absolute Eosinophils 0.1      Absolute Basophils 0.1      Absolute Immature Granulocytes 0.1      Absolute NRBCs 0.0     LIPASE - Normal    Lipase 18         Imaging   CT Abdomen Pelvis w/o Contrast   Final Result   IMPRESSION:    1.  Moderate to severe right renal pelvocaliectasis with abrupt   transition at the right ureterovesical junction where there is   ill-defined hyperdensity that could reflect small stones or possibly   blood products. Underlying stricture is not excluded. Of note, this is   in the region of surgical clips; correlate with prior surgery in this   region.   2.  Punctate left lower pole nonobstructing calculus.   3.  Borderline splenomegaly.      ELIZABETH CHENEY MD            SYSTEM ID:  [id number]            Independent Interpretation   None    ED Course      Medications Administered   Medications   ondansetron (ZOFRAN) injection 4 mg (4 mg Intravenous $Given 10/28/24 4799)   HYDROmorphone (PF) (DILAUDID) injection 0.3 mg (has no administration in time range)   HYDROmorphone (PF) (DILAUDID) injection 0.5 mg (has no administration in time range)   naloxone (NARCAN) injection 0.2 mg (has no administration in time range)     Or   naloxone (NARCAN) injection 0.4 mg (has no administration in time range)     Or   naloxone (NARCAN) injection 0.2 mg (has no administration in time range)     Or   naloxone (NARCAN) injection 0.4 mg (has no administration in time range)   ketorolac (TORADOL) injection 15 mg  (15 mg Intravenous $Given 10/28/24 0834)       Procedures   Procedures     Discussion of Management   Urology, FRANCOISE Snell for Dr. Kaplan  Hospitalist: Lisseth  Staffed with Dr. Vela  ED Course   ED Course as of 10/28/24 1518   Mon Oct 28, 2024   0815 I evaluated and examined the patient.  JS   0825 I obtained the history and evaluated the patient as noted above.    0915 I reevaluated the patient, pain much improved, nausea resolved.   1006 I spoke with urology, they recommend admission for stenting.       Additional Documentation  None    Medical Decision Making / Diagnosis     CMS Diagnoses: None    MIPS       None    [facility]   Arsalan Mittal is a 38 year old male with history of hypertension, GERD, and migraines with aura who presents with complaint of right flank and abdominal discomfort.  Differential includes but is not limited to ureterolithiasis, pyelonephritis, acute cystitis, appendicitis, peritonitis, SBO, testicular torsion, and epididymitis among many others.  Vitals reassuring at presentation without fever, tachycardia, or hypoxia.  On physical exam, patient had right CVA tenderness and mild tenderness to palpation of the RLQ of abdomen.  No testicular or penile discomfort or tenderness, I doubt testicular torsion or other scrotal pathologies.  On laboratory workup today, there was leukocytosis.  There was no anemia and no significant metabolic derangements.  His lipase was normal, I doubt pancreatitis as cause for his pain.  UA significant for large amount of red blood cells however there is no pyuria.  CT of abdomen pelvis was significant for severe right renal pelvocaliectasis that could represent small stones or possible stricture.  I spoke with urology who would like to admit the patient for stenting.  I spoke to the patient regarding findings, he agreed to the admission.  His pain remains well-controlled with 1 dose of Toradol.  I spoke with hospital medicine, patient accepted for admission for [facility]  obstruction with hydronephrosis.  Patient was subsequently admitted in stable condition.        Disposition   The patient was admitted to the hospital.     Diagnosis     ICD-10-CM    1. Hydronephrosis with ureteropelvic junction (UPJ) obstruction  Q62.11 Case Request: CYSTOSCOPY, WITH RETROGRADE PYELOGRAM AND URETERAL STENT REPLACEMENT     Case Request: CYSTOSCOPY, WITH RETROGRADE PYELOGRAM AND URETERAL STENT REPLACEMENT      2. Ureteropelvic junction obstruction  N13.5       3. Gastroesophageal reflux disease without esophagitis  K21.9            Discharge Medications   Current Discharge Medication List            EMILY Espinoza John, PA-C  10/28/24 1518

## 2024-10-29 NOTE — PROGRESS NOTES
A&OX4, VSS on RA, BP little high. Denies pain, n&V. Up ind. Tolerating regular diet. Voiding adequately. All discharge meds and instructions reviewed with pt and spouse, both verbalized understanding. All question answered. Stable at time of discharge.

## 2024-10-29 NOTE — ANESTHESIA POSTPROCEDURE EVALUATION
Patient: Arsalan Mittal    Procedure: Procedure(s):  CYSTOSCOPY, WITH RETROGRADE PYELOGRAM AND URETERAL STENT REPLACEMENT       Anesthesia Type:  General    Note:  Disposition: Inpatient   Postop Pain Control: Uneventful            Sign Out: Well controlled pain   PONV: No   Neuro/Psych: Uneventful            Sign Out: Acceptable/Baseline neuro status   Airway/Respiratory: Uneventful            Sign Out: Acceptable/Baseline resp. status   CV/Hemodynamics: Uneventful            Sign Out: Acceptable CV status; No obvious hypovolemia; No obvious fluid overload   Other NRE: NONE   DID A NON-ROUTINE EVENT OCCUR? No           Last vitals:  Vitals Value Taken Time   /95 10/28/24 1800   Temp 36.7  C (98  F) 10/28/24 1800   Pulse 67 10/28/24 1801   Resp 10 10/28/24 1801   SpO2 96 % 10/28/24 1803   Vitals shown include unfiled device data.    Electronically Signed By: Gerardo Willoughby DO  October 29, 2024  2:04 AM

## 2024-10-30 ENCOUNTER — PATIENT OUTREACH (OUTPATIENT)
Dept: CARE COORDINATION | Facility: CLINIC | Age: 39
End: 2024-10-30
Payer: COMMERCIAL

## 2024-10-30 NOTE — DISCHARGE SUMMARY
"[facility]  Hospitalist Discharge Summary      Date of Admission:  10/28/2024  Date of Discharge:  10/28/2024  7:43 PM  Discharging Provider: Yara Montanez PA-C  Discharge Service: Hospitalist Service    Discharge Diagnoses   Right hydronephrosis   Mod-severe right renal pelvocaliectasis   HTN  Hx migraine  Recent peroneal tendonitis     Clinically Significant Risk Factors     # Obesity: Estimated body mass index is 35.39 kg/m  as calculated from the following:    Height as of 10/22/24: 1.837 m (6' 0.32\").    Weight as of this encounter: 119.4 kg (263 lb 4.8 oz).       Follow-ups Needed After Discharge   Follow-up Appointments       Follow-up and recommended labs and tests       Follow up with Urology as advised by their team                Discharge Disposition   Discharged to home  Condition at discharge: Stable    Hospital Course   R flank/abdominal pain  Mod-severe right renal pelvocaliectasis   Hx urinary obstruction  Presents with acute onset R flank/abdominal pain with associated nausea.Voiding normally.   In 2005 had right ureteral stent placement for obstruction with subsequent right pyeloplasty, reports no recurrent issues until now.   *CT A/P shows moderate to severe right renal pelvis pelvocaliectasis with abrupt  transition at the right ureterovesical junction where there is ill-defined hyperdensity that could reflect small stones or possibly blood products. Underlying stricture is not excluded.  *UA shows large blood, no overt infection   *WBC elevated 16.6  *Cr 1.15  He was admitted to observation and underwent cystourethroscopy with right retrograde pyelography and placement of right ureteral stent. He will follow up in 2-3 weeks with urology for ureteroscopy.   --discharged on flomax   --no need for abx per urology      HTN  Hypertensive in 160s on presentation in the setting of pain   PTA he is on candesartan 8mg daily  --resume candesartan at discharge      Hx " migraine  Continue PTA topamax     Recent peroneal tendonitis L  On prednisone taper, continue per prior     Consultations This Hospital Stay   None    Code Status   No Order    Time Spent on this Encounter   I, Yara Montanez PA-C, personally saw the patient today and spent greater than 30 minutes discharging this patient.       Yara Montanez PA-C  [facility] EXTENDED RECOVERY AND SHORT STAY  [address] [facility] [address]  Phone: [phone]  ______________________________________________________________________         Primary Care Physician   Raman Hernandez    Discharge Orders      Reason for your hospital stay    You were her for evaluation of flank pain due to obstruction of your ureter     Follow-up and recommended labs and tests     Follow up with Urology as advised by their team     Activity    Your activity upon discharge: activity as tolerated     Diet    Follow this diet upon discharge: Regular diet       Significant Results and Procedures   Most Recent 3 CBC's:  Recent Labs   Lab Test 10/28/24  0834 02/08/23  1737 10/22/22  2021   WBC 16.6* 10.1 9.5   HGB 16.1 15.4 15.4   MCV 87 88 88    259 227     Most Recent 3 BMP's:  Recent Labs   Lab Test 10/28/24  0834 02/08/23  1737 10/22/22  2021    137 144   POTASSIUM 3.6 3.8 3.6   CHLORIDE 104 103 114*   CO2 26 23 25   BUN 15.7 18.9 19   CR 1.15 1.02 0.98   ANIONGAP 11 11 5   LAISHA 9.8 9.1 8.7   * 103* 145*     Most Recent Urinalysis:  Recent Labs   Lab Test 10/28/24  0828   COLOR Light Yellow   APPEARANCE Clear   URINEGLC Negative   URINEBILI Negative   URINEKETONE Negative   SG 1.025   UBLD Large*   URINEPH 5.5   PROTEIN Negative   NITRITE Negative   LEUKEST Negative   RBCU 71*   WBCU 0   ,   Results for orders placed or performed during the hospital encounter of 10/28/24   CT Abdomen Pelvis w/o Contrast    Narrative    CT ABDOMEN/PELVIS WITHOUT CONTRAST October 28, 2024 9:05 AM    CLINICAL  HISTORY: Right flank pain, right lower quadrant pain.     TECHNIQUE: CT scan of the abdomen and pelvis was performed without IV  contrast. Multiplanar reformats were obtained. Dose reduction  techniques were used.  CONTRAST: None.    COMPARISON: None available.    FINDINGS:   LOWER CHEST: Unremarkable.    HEPATOBILIARY: No significant mass or bile duct dilatation. No  calcified gallstones.     PANCREAS: No significant mass, duct dilatation, or inflammatory  change.    SPLEEN: Borderline splenomegaly.    ADRENAL GLANDS: No significant nodules.    KIDNEYS: Punctate left lower pole nonobstructing calculus. Left  parapelvic cyst; no follow-up necessary. Faint hyperdensity and abrupt  transition at the ureteropelvic junction (6/42) with upstream moderate  to severe pelvocaliectasis.     BOWEL: No obstruction or inflammatory change. Appendix is normal.    VASCULATURE: Nonaneurysmal abdominal aorta.    LYMPH NODE AND PERITONEUM: Multiple scattered subcentimeter  retroperitoneal lymph nodes are nonspecific, possibly reactive. Right  retroperitoneal surgical clips.    PELVIS: Unremarkable.    MUSCULOSKELETAL: No aggressive osseous lesion. Mild degenerative  changes of the spine.    OTHER: None.      Impression    IMPRESSION:   1.  Moderate to severe right renal pelvocaliectasis with abrupt  transition at the right ureterovesical junction where there is  ill-defined hyperdensity that could reflect small stones or possibly  blood products. Underlying stricture is not excluded. Of note, this is  in the region of surgical clips; correlate with prior surgery in this  region.  2.  Punctate left lower pole nonobstructing calculus.  3.  Borderline splenomegaly.    ELIZABETH CHENEY MD         SYSTEM ID:  [id number]   XR Surgery KAREN L/T 5 Min Fluoro w Stills    Narrative    This exam was marked as non-reportable because it will not be read by a   radiologist or a [place] non-radiologist provider.             Discharge  Medications   Discharge Medication List as of 10/28/2024  7:15 PM        START taking these medications    Details   tamsulosin (FLOMAX) 0.4 MG capsule Take 1 capsule (0.4 mg) by mouth daily., Disp-30 capsule, R-0, E-Prescribe           CONTINUE these medications which have NOT CHANGED    Details   candesartan (ATACAND) 8 MG tablet Take 1 tablet (8 mg) by mouth daily, Disp-90 tablet, R-3, E-Prescribe      Elderberry 500 MG CAPS Take 1 capsule by mouth every evening., Historical      omeprazole (PRILOSEC) 20 MG DR capsule Take 1 capsule (20 mg) by mouth 2 times daily, Disp-180 capsule, R-1, E-Prescribe      predniSONE (DELTASONE) 20 MG tablet Take 3 tabs by mouth daily x 3 days, then 2 tabs daily x 3 days, then 1 tab daily x 3 days, then 1/2 tab daily x 3 days., Disp-20 tablet, R-0, E-Prescribe      SUMAtriptan (IMITREX) 100 MG tablet Take 1 tablet at onset of headache may repeat once after 2 hrs if headache recurs, Disp-20 tablet, R-1, E-Prescribe      topiramate (TOPAMAX) 50 MG tablet TAKE ONE TABLET BY MOUTH ONCE DAILY, Disp-90 tablet, R-1, E-Prescribe      triamcinolone (KENALOG) 0.1 % external cream Apply topically 2 times dailyDisp-80 g, [id number]      Vitamin D3 (VITAMIN D, CHOLECALCIFEROL,) 25 mcg (1000 units) tablet Take 1 tablet by mouth daily., Historical           Allergies   No Known Allergies

## 2024-10-31 ENCOUNTER — TELEPHONE (OUTPATIENT)
Dept: UROLOGY | Facility: CLINIC | Age: 39
End: 2024-10-31
Payer: COMMERCIAL

## 2024-10-31 NOTE — TELEPHONE ENCOUNTER
1031  attempted to call pts wife Koko as requested I called 3x when they answered phone, it was immediately disconnected, I will reach out again tomorrow   [facility]  Alena  [phone]

## 2024-11-01 ENCOUNTER — NURSE TRIAGE (OUTPATIENT)
Dept: NURSING | Facility: CLINIC | Age: 39
End: 2024-11-01
Payer: COMMERCIAL

## 2024-11-01 ENCOUNTER — TELEPHONE (OUTPATIENT)
Dept: UROLOGY | Facility: CLINIC | Age: 39
End: 2024-11-01
Payer: COMMERCIAL

## 2024-11-01 NOTE — TELEPHONE ENCOUNTER
Please call patients phone to schedule, since you were unable to get through on wife's phone.  Natasha Whatley LPN

## 2024-11-01 NOTE — TELEPHONE ENCOUNTER
Blood in urine is improving, he will push fluids, avoid bladder irritants, if bleeding worsens, big clots, dark cherry red urine . He will call or go to the ED.  Natasha Whatley LPN

## 2024-11-01 NOTE — TELEPHONE ENCOUNTER
"    Nurse Triage SBAR       Is this a 2nd Level Triage?  YES, LICENSED PRACTITIONER REVIEW IS REQUIRED    CC: tea colored urine, dull ache lower right back    Situation:   Pt s/p cystoscopy and right stent placement on 10/28 with Dr Kaplan. Pt's wife reports that pt had blood in urine for 3 days post procedure, then urine had been clear and pt \"felt better\".  This morning pt has had tea colored urine with each urination and reports having dull ache in lower right side of back.  States \"it feels like it pulls\" when he moves.  Pt denies fever, urgency, frequency, burning with urination.  Pt states he has been drinking 64 plus ozs of fluid.  Pt would like call back from clinic to further discuss symptoms.     Protocol Recommended Disposition:   Discuss With PCP And Call Back By Nurse Today        Recommendation: Please call pt to further assess symptoms. Encouraged patient to: Callback if: symptoms worsen or if new symptoms arise or go to ER for evaluation. Pt verbalizes understanding and amenable to plan.     Routed to provider/care team.  Grazyna ARROYO RN  [facility]/Red Flag Triage & Med Refill Team    Reason for Disposition   Side (flank) or back pain present    Additional Information   Negative: Shock suspected (e.g., cold/pale/clammy skin, too weak to stand, low BP, rapid pulse)   Negative: Sounds like a life-threatening emergency to the triager   Negative: Urinary catheter, questions about   Negative: Recent back or abdominal injury   Negative: Recent genital injury   Negative: Unable to urinate (or only a few drops) > 4 hours and bladder feels very full (e.g., palpable bladder or strong urge to urinate)   Negative: Diffuse (all over) muscle pains in the shoulders, arms, legs, and back and dark (cola or tea-colored) or red-colored urine   Negative: Passing pure blood or large blood clots (i.e., larger than a dime or grape)   Negative: Fever > 100.4 F (38.0 C)   Negative: Patient sounds very sick or weak to the " "triager   Negative: Known sickle cell disease   Negative: Taking Coumadin (warfarin) or other strong blood thinner, or known bleeding disorder (e.g., thrombocytopenia)    Answer Assessment - Initial Assessment Questions  1. COLOR of URINE: \"Describe the color of the urine.\"  (e.g., tea-colored, pink, red, bloody) \"Do you have blood clots in your urine?\" (e.g., none, pea, grape, small coin)      Tea colored  2. ONSET: \"When did the bleeding start?\"       Today after not bleeding x 2 days  3. EPISODES: \"How many times has there been blood in the urine?\" or \"How many times today?\"      3  4. PAIN with URINATION: \"Is there any pain with passing your urine?\" If Yes, ask: \"How bad is the pain?\"  (Scale 1-10; or mild, moderate, severe)     - MILD: Complains slightly about urination hurting.     - MODERATE: Interferes with normal activities.       - SEVERE: Excruciating, unwilling or unable to urinate because of the pain.       no  5. FEVER: \"Do you have a fever?\" If Yes, ask: \"What is your temperature, how was it measured, and when did it start?\"      no  6. ASSOCIATED SYMPTOMS: \"Are you passing urine more frequently than usual?\"      no  7. OTHER SYMPTOMS: \"Do you have any other symptoms?\" (e.g., back/flank pain, abdomen pain, vomiting)      Right lower back pain  8. PREGNANCY: \"Is there any chance you are pregnant?\" \"When was your last menstrual period?\"      N/a    Protocols used: Urine - Blood In-A-OH    "

## 2024-11-04 ENCOUNTER — LAB (OUTPATIENT)
Dept: LAB | Facility: CLINIC | Age: 39
End: 2024-11-04
Payer: COMMERCIAL

## 2024-11-04 DIAGNOSIS — R31.9 URINARY TRACT INFECTION WITH HEMATURIA, SITE UNSPECIFIED: Primary | ICD-10-CM

## 2024-11-04 DIAGNOSIS — N13.30 HYDRONEPHROSIS: ICD-10-CM

## 2024-11-04 DIAGNOSIS — R31.9 HEMATURIA: ICD-10-CM

## 2024-11-04 DIAGNOSIS — R31.9 HEMATURIA: Primary | ICD-10-CM

## 2024-11-04 DIAGNOSIS — N39.0 URINARY TRACT INFECTION WITH HEMATURIA, SITE UNSPECIFIED: Primary | ICD-10-CM

## 2024-11-04 LAB
ALBUMIN UR-MCNC: 100 MG/DL
APPEARANCE UR: CLEAR
BILIRUB UR QL STRIP: NEGATIVE
COLOR UR AUTO: ABNORMAL
GLUCOSE UR STRIP-MCNC: NEGATIVE MG/DL
HGB UR QL STRIP: ABNORMAL
KETONES UR STRIP-MCNC: NEGATIVE MG/DL
LEUKOCYTE ESTERASE UR QL STRIP: ABNORMAL
NITRATE UR QL: NEGATIVE
PH UR STRIP: 6 [PH] (ref 5–7)
RBC URINE: >182 /HPF
SP GR UR STRIP: 1.01 (ref 1–1.03)
UROBILINOGEN UR STRIP-ACNC: 0.2 E.U./DL
WBC URINE: 177 /HPF

## 2024-11-04 PROCEDURE — 87086 URINE CULTURE/COLONY COUNT: CPT

## 2024-11-04 PROCEDURE — 81001 URINALYSIS AUTO W/SCOPE: CPT

## 2024-11-06 LAB — BACTERIA UR CULT: NO GROWTH

## 2024-11-07 ENCOUNTER — ANESTHESIA (OUTPATIENT)
Dept: SURGERY | Facility: CLINIC | Age: 39
End: 2024-11-07
Payer: COMMERCIAL

## 2024-11-07 ENCOUNTER — ANESTHESIA EVENT (OUTPATIENT)
Dept: SURGERY | Facility: CLINIC | Age: 39
End: 2024-11-07
Payer: COMMERCIAL

## 2024-11-07 ENCOUNTER — APPOINTMENT (OUTPATIENT)
Dept: GENERAL RADIOLOGY | Facility: CLINIC | Age: 39
End: 2024-11-07
Attending: STUDENT IN AN ORGANIZED HEALTH CARE EDUCATION/TRAINING PROGRAM
Payer: COMMERCIAL

## 2024-11-07 ENCOUNTER — HOSPITAL ENCOUNTER (OUTPATIENT)
Facility: CLINIC | Age: 39
Discharge: HOME | End: 2024-11-07
Attending: STUDENT IN AN ORGANIZED HEALTH CARE EDUCATION/TRAINING PROGRAM | Admitting: STUDENT IN AN ORGANIZED HEALTH CARE EDUCATION/TRAINING PROGRAM
Payer: COMMERCIAL

## 2024-11-07 VITALS
HEIGHT: 73 IN | DIASTOLIC BLOOD PRESSURE: 99 MMHG | HEART RATE: 85 BPM | SYSTOLIC BLOOD PRESSURE: 149 MMHG | TEMPERATURE: 98 F | RESPIRATION RATE: 16 BRPM | BODY MASS INDEX: 34.79 KG/M2 | WEIGHT: 262.5 LBS | OXYGEN SATURATION: 98 %

## 2024-11-07 DIAGNOSIS — N13.5 URETEROPELVIC JUNCTION OBSTRUCTION: Primary | ICD-10-CM

## 2024-11-07 PROCEDURE — 250N000011 HC RX IP 250 OP 636: Performed by: NURSE ANESTHETIST, CERTIFIED REGISTERED

## 2024-11-07 PROCEDURE — 999N000141 HC STATISTIC PRE-PROCEDURE NURSING ASSESSMENT: Performed by: STUDENT IN AN ORGANIZED HEALTH CARE EDUCATION/TRAINING PROGRAM

## 2024-11-07 PROCEDURE — 360N000075 HC SURGERY LEVEL 2, PER MIN: Performed by: STUDENT IN AN ORGANIZED HEALTH CARE EDUCATION/TRAINING PROGRAM

## 2024-11-07 PROCEDURE — 52341 CYSTO W/URETER STRICTURE TX: CPT | Mod: RT | Performed by: STUDENT IN AN ORGANIZED HEALTH CARE EDUCATION/TRAINING PROGRAM

## 2024-11-07 PROCEDURE — 710N000012 HC RECOVERY PHASE 2, PER MINUTE: Performed by: STUDENT IN AN ORGANIZED HEALTH CARE EDUCATION/TRAINING PROGRAM

## 2024-11-07 PROCEDURE — 250N000011 HC RX IP 250 OP 636: Performed by: STUDENT IN AN ORGANIZED HEALTH CARE EDUCATION/TRAINING PROGRAM

## 2024-11-07 PROCEDURE — 710N000009 HC RECOVERY PHASE 1, LEVEL 1, PER MIN: Performed by: STUDENT IN AN ORGANIZED HEALTH CARE EDUCATION/TRAINING PROGRAM

## 2024-11-07 PROCEDURE — C2617 STENT, NON-COR, TEM W/O DEL: HCPCS | Performed by: STUDENT IN AN ORGANIZED HEALTH CARE EDUCATION/TRAINING PROGRAM

## 2024-11-07 PROCEDURE — 258N000003 HC RX IP 258 OP 636: Performed by: NURSE ANESTHETIST, CERTIFIED REGISTERED

## 2024-11-07 PROCEDURE — C1769 GUIDE WIRE: HCPCS | Performed by: STUDENT IN AN ORGANIZED HEALTH CARE EDUCATION/TRAINING PROGRAM

## 2024-11-07 PROCEDURE — 52345 CYSTO/URETERO W/UP STRICTURE: CPT | Performed by: ANESTHESIOLOGY

## 2024-11-07 PROCEDURE — 999N000180 XR SURGERY CARM FLUORO LESS THAN 5 MIN: Mod: TC

## 2024-11-07 PROCEDURE — 258N000001 HC RX 258: Performed by: STUDENT IN AN ORGANIZED HEALTH CARE EDUCATION/TRAINING PROGRAM

## 2024-11-07 PROCEDURE — 250N000025 HC SEVOFLURANE, PER MIN: Performed by: STUDENT IN AN ORGANIZED HEALTH CARE EDUCATION/TRAINING PROGRAM

## 2024-11-07 PROCEDURE — 370N000017 HC ANESTHESIA TECHNICAL FEE, PER MIN: Performed by: STUDENT IN AN ORGANIZED HEALTH CARE EDUCATION/TRAINING PROGRAM

## 2024-11-07 PROCEDURE — C1758 CATHETER, URETERAL: HCPCS | Performed by: STUDENT IN AN ORGANIZED HEALTH CARE EDUCATION/TRAINING PROGRAM

## 2024-11-07 PROCEDURE — 250N000013 HC RX MED GY IP 250 OP 250 PS 637: Performed by: ANESTHESIOLOGY

## 2024-11-07 PROCEDURE — 250N000009 HC RX 250: Performed by: NURSE ANESTHETIST, CERTIFIED REGISTERED

## 2024-11-07 PROCEDURE — 52345 CYSTO/URETERO W/UP STRICTURE: CPT | Performed by: NURSE ANESTHETIST, CERTIFIED REGISTERED

## 2024-11-07 PROCEDURE — 52332 CYSTOSCOPY AND TREATMENT: CPT | Mod: RT | Performed by: STUDENT IN AN ORGANIZED HEALTH CARE EDUCATION/TRAINING PROGRAM

## 2024-11-07 PROCEDURE — 250N000009 HC RX 250: Performed by: STUDENT IN AN ORGANIZED HEALTH CARE EDUCATION/TRAINING PROGRAM

## 2024-11-07 PROCEDURE — 74420 UROGRAPHY RTRGR +-KUB: CPT | Mod: 26 | Performed by: STUDENT IN AN ORGANIZED HEALTH CARE EDUCATION/TRAINING PROGRAM

## 2024-11-07 PROCEDURE — 272N000001 HC OR GENERAL SUPPLY STERILE: Performed by: STUDENT IN AN ORGANIZED HEALTH CARE EDUCATION/TRAINING PROGRAM

## 2024-11-07 PROCEDURE — C1726 CATH, BAL DIL, NON-VASCULAR: HCPCS | Performed by: STUDENT IN AN ORGANIZED HEALTH CARE EDUCATION/TRAINING PROGRAM

## 2024-11-07 DEVICE — URETERAL STENT
Type: IMPLANTABLE DEVICE | Site: URETER | Status: FUNCTIONAL
Brand: POLARIS™ ULTRA

## 2024-11-07 RX ORDER — MEPERIDINE HYDROCHLORIDE 25 MG/ML
12.5 INJECTION INTRAMUSCULAR; INTRAVENOUS; SUBCUTANEOUS EVERY 5 MIN PRN
Status: DISCONTINUED | OUTPATIENT
Start: 2024-11-07 | End: 2024-11-07 | Stop reason: HOSPADM

## 2024-11-07 RX ORDER — NALOXONE HYDROCHLORIDE 0.4 MG/ML
0.1 INJECTION, SOLUTION INTRAMUSCULAR; INTRAVENOUS; SUBCUTANEOUS
Status: DISCONTINUED | OUTPATIENT
Start: 2024-11-07 | End: 2024-11-07 | Stop reason: HOSPADM

## 2024-11-07 RX ORDER — OXYCODONE HYDROCHLORIDE 5 MG/1
10 TABLET ORAL
Status: DISCONTINUED | OUTPATIENT
Start: 2024-11-07 | End: 2024-11-07 | Stop reason: HOSPADM

## 2024-11-07 RX ORDER — FENTANYL CITRATE 50 UG/ML
INJECTION, SOLUTION INTRAMUSCULAR; INTRAVENOUS PRN
Status: DISCONTINUED | OUTPATIENT
Start: 2024-11-07 | End: 2024-11-07

## 2024-11-07 RX ORDER — SODIUM CHLORIDE, SODIUM LACTATE, POTASSIUM CHLORIDE, CALCIUM CHLORIDE 600; 310; 30; 20 MG/100ML; MG/100ML; MG/100ML; MG/100ML
INJECTION, SOLUTION INTRAVENOUS CONTINUOUS
Status: DISCONTINUED | OUTPATIENT
Start: 2024-11-07 | End: 2024-11-07 | Stop reason: HOSPADM

## 2024-11-07 RX ORDER — ONDANSETRON 2 MG/ML
INJECTION INTRAMUSCULAR; INTRAVENOUS PRN
Status: DISCONTINUED | OUTPATIENT
Start: 2024-11-07 | End: 2024-11-07

## 2024-11-07 RX ORDER — FENTANYL CITRATE 50 UG/ML
50 INJECTION, SOLUTION INTRAMUSCULAR; INTRAVENOUS EVERY 5 MIN PRN
Status: DISCONTINUED | OUTPATIENT
Start: 2024-11-07 | End: 2024-11-07 | Stop reason: HOSPADM

## 2024-11-07 RX ORDER — CEFAZOLIN SODIUM/WATER 2 G/20 ML
2 SYRINGE (ML) INTRAVENOUS
Status: COMPLETED | OUTPATIENT
Start: 2024-11-07 | End: 2024-11-07

## 2024-11-07 RX ORDER — CEFAZOLIN SODIUM/WATER 2 G/20 ML
2 SYRINGE (ML) INTRAVENOUS SEE ADMIN INSTRUCTIONS
Status: DISCONTINUED | OUTPATIENT
Start: 2024-11-07 | End: 2024-11-07 | Stop reason: HOSPADM

## 2024-11-07 RX ORDER — DEXMEDETOMIDINE HYDROCHLORIDE 4 UG/ML
INJECTION, SOLUTION INTRAVENOUS PRN
Status: DISCONTINUED | OUTPATIENT
Start: 2024-11-07 | End: 2024-11-07

## 2024-11-07 RX ORDER — PROPOFOL 10 MG/ML
INJECTION, EMULSION INTRAVENOUS PRN
Status: DISCONTINUED | OUTPATIENT
Start: 2024-11-07 | End: 2024-11-07

## 2024-11-07 RX ORDER — DEXAMETHASONE SODIUM PHOSPHATE 4 MG/ML
INJECTION, SOLUTION INTRA-ARTICULAR; INTRALESIONAL; INTRAMUSCULAR; INTRAVENOUS; SOFT TISSUE PRN
Status: DISCONTINUED | OUTPATIENT
Start: 2024-11-07 | End: 2024-11-07

## 2024-11-07 RX ORDER — TAMSULOSIN HYDROCHLORIDE 0.4 MG/1
0.4 CAPSULE ORAL DAILY
Qty: 30 CAPSULE | Refills: 2 | Status: SHIPPED | OUTPATIENT
Start: 2024-11-07

## 2024-11-07 RX ORDER — IOPAMIDOL 612 MG/ML
INJECTION, SOLUTION INTRATHECAL PRN
Status: DISCONTINUED | OUTPATIENT
Start: 2024-11-07 | End: 2024-11-07 | Stop reason: HOSPADM

## 2024-11-07 RX ORDER — DEXAMETHASONE SODIUM PHOSPHATE 4 MG/ML
4 INJECTION, SOLUTION INTRA-ARTICULAR; INTRALESIONAL; INTRAMUSCULAR; INTRAVENOUS; SOFT TISSUE
Status: DISCONTINUED | OUTPATIENT
Start: 2024-11-07 | End: 2024-11-07 | Stop reason: HOSPADM

## 2024-11-07 RX ORDER — ONDANSETRON 4 MG/1
4 TABLET, ORALLY DISINTEGRATING ORAL EVERY 30 MIN PRN
Status: DISCONTINUED | OUTPATIENT
Start: 2024-11-07 | End: 2024-11-07 | Stop reason: HOSPADM

## 2024-11-07 RX ORDER — HYDRALAZINE HYDROCHLORIDE 20 MG/ML
2.5-5 INJECTION INTRAMUSCULAR; INTRAVENOUS
Status: DISCONTINUED | OUTPATIENT
Start: 2024-11-07 | End: 2024-11-07 | Stop reason: HOSPADM

## 2024-11-07 RX ORDER — LIDOCAINE HYDROCHLORIDE 20 MG/ML
INJECTION, SOLUTION INFILTRATION; PERINEURAL PRN
Status: DISCONTINUED | OUTPATIENT
Start: 2024-11-07 | End: 2024-11-07

## 2024-11-07 RX ORDER — ACETAMINOPHEN 325 MG/1
975 TABLET ORAL ONCE
Status: DISCONTINUED | OUTPATIENT
Start: 2024-11-07 | End: 2024-11-07 | Stop reason: HOSPADM

## 2024-11-07 RX ORDER — MAGNESIUM HYDROXIDE 1200 MG/15ML
LIQUID ORAL PRN
Status: DISCONTINUED | OUTPATIENT
Start: 2024-11-07 | End: 2024-11-07 | Stop reason: HOSPADM

## 2024-11-07 RX ORDER — FENTANYL CITRATE 50 UG/ML
25 INJECTION, SOLUTION INTRAMUSCULAR; INTRAVENOUS EVERY 5 MIN PRN
Status: DISCONTINUED | OUTPATIENT
Start: 2024-11-07 | End: 2024-11-07 | Stop reason: HOSPADM

## 2024-11-07 RX ORDER — HYDROMORPHONE HCL IN WATER/PF 6 MG/30 ML
0.2 PATIENT CONTROLLED ANALGESIA SYRINGE INTRAVENOUS EVERY 5 MIN PRN
Status: DISCONTINUED | OUTPATIENT
Start: 2024-11-07 | End: 2024-11-07 | Stop reason: HOSPADM

## 2024-11-07 RX ORDER — LABETALOL HYDROCHLORIDE 5 MG/ML
5 INJECTION, SOLUTION INTRAVENOUS
Status: DISCONTINUED | OUTPATIENT
Start: 2024-11-07 | End: 2024-11-07 | Stop reason: HOSPADM

## 2024-11-07 RX ORDER — ACETAMINOPHEN 650 MG/1
650 SUPPOSITORY RECTAL ONCE
Status: DISCONTINUED | OUTPATIENT
Start: 2024-11-07 | End: 2024-11-07 | Stop reason: HOSPADM

## 2024-11-07 RX ORDER — OXYCODONE HYDROCHLORIDE 5 MG/1
5 TABLET ORAL
Status: COMPLETED | OUTPATIENT
Start: 2024-11-07 | End: 2024-11-07

## 2024-11-07 RX ORDER — SODIUM CHLORIDE, SODIUM LACTATE, POTASSIUM CHLORIDE, CALCIUM CHLORIDE 600; 310; 30; 20 MG/100ML; MG/100ML; MG/100ML; MG/100ML
INJECTION, SOLUTION INTRAVENOUS CONTINUOUS PRN
Status: DISCONTINUED | OUTPATIENT
Start: 2024-11-07 | End: 2024-11-07

## 2024-11-07 RX ORDER — OXYBUTYNIN CHLORIDE 10 MG/1
10 TABLET, EXTENDED RELEASE ORAL DAILY
Qty: 30 TABLET | Refills: 1 | Status: SHIPPED | OUTPATIENT
Start: 2024-11-07

## 2024-11-07 RX ORDER — ONDANSETRON 2 MG/ML
4 INJECTION INTRAMUSCULAR; INTRAVENOUS EVERY 30 MIN PRN
Status: DISCONTINUED | OUTPATIENT
Start: 2024-11-07 | End: 2024-11-07 | Stop reason: HOSPADM

## 2024-11-07 RX ORDER — HYDROMORPHONE HCL IN WATER/PF 6 MG/30 ML
0.4 PATIENT CONTROLLED ANALGESIA SYRINGE INTRAVENOUS EVERY 5 MIN PRN
Status: DISCONTINUED | OUTPATIENT
Start: 2024-11-07 | End: 2024-11-07 | Stop reason: HOSPADM

## 2024-11-07 RX ORDER — PHENAZOPYRIDINE HYDROCHLORIDE 100 MG/1
100 TABLET, FILM COATED ORAL 3 TIMES DAILY PRN
Qty: 20 TABLET | Refills: 0 | Status: SHIPPED | OUTPATIENT
Start: 2024-11-07 | End: 2024-11-14

## 2024-11-07 RX ADMIN — PROPOFOL 230 MG: 10 INJECTION, EMULSION INTRAVENOUS at 13:08

## 2024-11-07 RX ADMIN — OXYCODONE HYDROCHLORIDE 5 MG: 5 TABLET ORAL at 14:29

## 2024-11-07 RX ADMIN — FENTANYL CITRATE 50 MCG: 50 INJECTION INTRAMUSCULAR; INTRAVENOUS at 13:18

## 2024-11-07 RX ADMIN — PHENYLEPHRINE HYDROCHLORIDE 150 MCG: 10 INJECTION INTRAVENOUS at 13:40

## 2024-11-07 RX ADMIN — SODIUM CHLORIDE, POTASSIUM CHLORIDE, SODIUM LACTATE AND CALCIUM CHLORIDE: 600; 310; 30; 20 INJECTION, SOLUTION INTRAVENOUS at 13:05

## 2024-11-07 RX ADMIN — MIDAZOLAM 2 MG: 1 INJECTION INTRAMUSCULAR; INTRAVENOUS at 13:05

## 2024-11-07 RX ADMIN — LIDOCAINE HYDROCHLORIDE 60 MG: 20 INJECTION, SOLUTION INFILTRATION; PERINEURAL at 13:08

## 2024-11-07 RX ADMIN — Medication 2 G: at 13:08

## 2024-11-07 RX ADMIN — ONDANSETRON 4 MG: 2 INJECTION INTRAMUSCULAR; INTRAVENOUS at 13:08

## 2024-11-07 RX ADMIN — DEXAMETHASONE SODIUM PHOSPHATE 4 MG: 4 INJECTION, SOLUTION INTRA-ARTICULAR; INTRALESIONAL; INTRAMUSCULAR; INTRAVENOUS; SOFT TISSUE at 13:08

## 2024-11-07 RX ADMIN — DEXMEDETOMIDINE HYDROCHLORIDE 20 MCG: 200 INJECTION INTRAVENOUS at 13:13

## 2024-11-07 RX ADMIN — FENTANYL CITRATE 50 MCG: 50 INJECTION INTRAMUSCULAR; INTRAVENOUS at 13:08

## 2024-11-07 ASSESSMENT — ACTIVITIES OF DAILY LIVING (ADL)
ADLS_ACUITY_SCORE: 0

## 2024-11-07 NOTE — ANESTHESIA POSTPROCEDURE EVALUATION
Patient: Arsalan Mittal    Procedure: Procedure(s):  CYSTOURETEROSCOPY, WITH RETROGRADE PYELOGRAM AND STENT INSERTION  Cystoscopy, dilate ureter(s), combined       Anesthesia Type:  General    Note:     Postop Pain Control: Uneventful            Sign Out: Well controlled pain   PONV:    Neuro/Psych: Uneventful            Sign Out: Acceptable/Baseline neuro status   Airway/Respiratory: Uneventful            Sign Out: Acceptable/Baseline resp. status   CV/Hemodynamics: Uneventful            Sign Out: Acceptable CV status; No obvious hypovolemia; No obvious fluid overload   Other NRE:    DID A NON-ROUTINE EVENT OCCUR?            Last vitals:  Vitals Value Taken Time   /97 11/07/24 1430   Temp 37.1  C (98.7  F) 11/07/24 1430   Pulse 85 11/07/24 1430   Resp 18 11/07/24 1430   SpO2 96 % 11/07/24 1430       Electronically Signed By: Darrin Pelletier MD  November 7, 2024  4:19 PM

## 2024-11-07 NOTE — ANESTHESIA PROCEDURE NOTES
Airway       Patient location during procedure: OR  Staff -        CRNA: Kareem Sandoval APRN CRNA       Performed By: CRNA  Consent for Airway        Urgency: elective  Indications and Patient Condition       Indications for airway management: genoveva-procedural       Induction type:intravenous       Mask difficulty assessment: 0 - not attempted    Final Airway Details       Final airway type: supraglottic airway    Supraglottic Airway Details        Type: LMA       Brand: Ambu AuraGain       LMA size: 5    Post intubation assessment        Placement verified by: capnometry and chest rise        Number of attempts at approach: 1       Number of other approaches attempted: 0       Ease of procedure: easy       Dentition: Intact and Unchanged

## 2024-11-07 NOTE — ANESTHESIA CARE TRANSFER NOTE
Patient: Arsalan Mittal    Procedure: Procedure(s):  CYSTOURETEROSCOPY, WITH RETROGRADE PYELOGRAM AND STENT INSERTION  Cystoscopy, dilate ureter(s), combined       Diagnosis: Ureteropelvic junction (UPJ) obstruction, right [N13.5]  Diagnosis Additional Information: No value filed.    Anesthesia Type:   General     Note:    Oropharynx: oropharynx clear of all foreign objects and spontaneously breathing  Level of Consciousness: awake  Oxygen Supplementation: room air    Independent Airway: airway patency satisfactory and stable  Dentition: dentition unchanged  Vital Signs Stable: post-procedure vital signs reviewed and stable  Report to RN Given: handoff report given  Patient transferred to: PACU    Handoff Report: Identifed the Patient, Identified the Reponsible Provider, Reviewed the pertinent medical history, Discussed the surgical course, Reviewed Intra-OP anesthesia mangement and issues during anesthesia, Set expectations for post-procedure period and Allowed opportunity for questions and acknowledgement of understanding      Vitals:  Vitals Value Taken Time   /88 11/07/24 1400   Temp     Pulse 80 11/07/24 1401   Resp 11 11/07/24 1401   SpO2 95 % 11/07/24 1401   Vitals shown include unfiled device data.    Electronically Signed By: KAE Hanna CRNA  November 7, 2024  2:03 PM

## 2024-11-07 NOTE — OP NOTE
OPERATIVE REPORT    PREOPERATIVE DIAGNOSIS: Right hydronephrosis    POSTOPERATIVE DIAGNOSIS:  Same, right ureteral stricture    PROCEDURES PERFORMED:   Right ureteroscopy with ureteral balloon dilation  Cystourethroscopy with right retrograde pyelogram  Right ureteral stent exchange  Intraoperative interpretation of fluoroscopic imaging.     STAFF SURGEON: Manuela Kaplan MD    ANESTHESIA: General    ESTIMATED BLOOD LOSS: 0 mL.     DRAINS:  Right 6 Fr x 26 cm JJ stent     OPERATIVE INDICATIONS:   Arsalan Mittal is a 38 year old male who underwent laparoscopic Pyeloplasty with Dr. Major in 2005 who recently presented to the ER with right hydronephrosis and flank pain. There appeared to be a possible small calcification at the level of prior repair. He underwent a right ureteral stent placement at this time. He now presents for the above procedure. If a small stone is encountered, we will remove it. If a recurrent stricture is encountered, we will perform balloon dilation and stent replacement. The patient was counseled on the alternatives, risks, and benefits and elected to proceed with the above stated procedure.    DESCRIPTION OF PROCEDURE:    After informed consent was obtained, the patient was taken to the operating room, and moved to the operating table.  After adequate anesthesia was induced, the patient was repositioned in dorsal lithotomy position and prepped and draped in the usual sterile fashion. A timeout was taken to confirm correct patient, procedure and laterality.     A 22-Bolivian cystoscope was inserted into a well lubricated urethra. The urethra was unremarkable, and the prostate was no enlarged. The bladder was free of tumors, stones or diverticuli.  The media was clear.  Bilateral ureteral orifices were orthotopic. The existing right ureteral stent was seen eminating from the right ureteral orifice. This was grasped and brought to the urethral meatus. A Sensor guidewire was advanced into the right  renal pelvis through the stent and the stent was removed.     Next, a flexible ureteroscope was passed into the renal pelvis over the wire. A retrograde pyelogram was performed via the ureteroscope to serve as a roadmap. Complete pyeloscopy was performed and no calcifications were identified. I then pulled the ureteroscope into the proximal ureter and a stricture was encountered at the UPJ        Pullback ureteroscopy was performed and the remainder of the ureter appeared normal.    Next, and 18 Fr 4 cm ureteral balloon dilator was inserted over the wire to the level of the stricture. It was inflated to the rated burst pressure and allowed to sit for 3 minutes. The balloon was deflated and removed. A 6 Fr x 26 cm JJ stent was inserted over the wire with good curl in the renal pelvis and good curl in the bladder. Contrast abruptly flowed out of the stent.     The bladder was then drained. This concluded the procedure. There were no complications.     PLAN:   - Stent removal in 4 weeks and renal US in 8 weeks. If recurrent hydronephrosis, would recommend repeating his pyeloplasty robotically.     Manuela Kaplan MD  Reconstructive Urology  [facility]

## 2024-11-07 NOTE — DISCHARGE INSTRUCTIONS
Took 1 oxycodone 5mg @ 2:30pm    Same Day Surgery Discharge Instructions for  Sedation and General Anesthesia     It's not unusual to feel dizzy, light-headed or faint for up to 24 hours after surgery or while taking pain medication.  If you have these symptoms: sit for a few minutes before standing and have someone assist you when you get up to walk or use the bathroom.    You should rest and relax for the next 24 hours. We recommend you make arrangements to have an adult stay with you for at least 24 hours after your discharge.  Avoid hazardous and strenuous activity.    DO NOT DRIVE any vehicle or operate mechanical equipment for 24 hours following the end of your surgery.  Even though you may feel normal, your reactions may be affected by the medication you have received.    Do not drink alcoholic beverages for 24 hours following surgery.     Slowly progress to your regular diet as you feel able. It's not unusual to feel nauseated and/or vomit after receiving anesthesia.  If you develop these symptoms, drink clear liquids (apple juice, ginger ale, broth, 7-up, etc. ) until you feel better.  If your nausea and vomiting persists for 24 hours, please notify your surgeon.      All narcotic pain medications, along with inactivity and anesthesia, can cause constipation. Drinking plenty of liquids and increasing fiber intake will help.    For any questions of a medical nature, call your surgeon.    Do not make important decisions for 24 hours.    If you had general anesthesia, you may have a sore throat for a couple of days related to the breathing tube used during surgery.  You may use Cepacol lozenges to help with this discomfort.  If it worsens or if you develop a fever, contact your surgeon.     If you feel your pain is not well managed with the pain medications prescribed by your surgeon, please contact your surgeon's office to let them know so they can address your concerns.      **If you have questions or  concerns about your procedure,   call Dr. Kaplan  [phone]**

## 2024-11-07 NOTE — ANESTHESIA PREPROCEDURE EVALUATION
Anesthesia Pre-Procedure Evaluation    Patient: Arsalan Mittal   MRN: [id number] : 1985        Procedure : Procedure(s):  CYSTOURETEROSCOPY, WITH RETROGRADE PYELOGRAM AND STENT INSERTION          Past Medical History:   Diagnosis Date    GERD (gastroesophageal reflux disease)     Hydronephrosis, right     Hypertension     Migraine     Peroneal tendonitis       Past Surgical History:   Procedure Laterality Date    ABDOMEN SURGERY      ureter surgery    COMBINED CYSTOSCOPY, RETROGRADES, EXCHANGE STENT URETER(S) Right 10/28/2024    Procedure: CYSTOSCOPY, WITH RETROGRADE PYELOGRAM AND URETERAL STENT REPLACEMENT;  Surgeon: Manuela Kaplan MD;  Location:  OR      No Known Allergies   Social History     Tobacco Use    Smoking status: Former     Current packs/day: 0.00     Types: Cigarettes     Quit date: 2010     Years since quittin.5    Smokeless tobacco: Never   Substance Use Topics    Alcohol use: Not Currently     Comment: occassional      Wt Readings from Last 1 Encounters:   24 119.1 kg (262 lb 8 oz)        Anesthesia Evaluation            ROS/MED HX  ENT/Pulmonary:       Neurologic:     (+)      migraines,                          Cardiovascular:     (+)  hypertension- -   -  - -                                      METS/Exercise Tolerance:     Hematologic:       Musculoskeletal:       GI/Hepatic:     (+) GERD,                   Renal/Genitourinary:     (+)       Nephrolithiasis  (complicated by hydronephrosis),       Endo:     (+)               Obesity,       Psychiatric/Substance Use:       Infectious Disease:       Malignancy:       Other:            Physical Exam    Airway        Mallampati: II   TM distance: > 3 FB   Neck ROM: full   Mouth opening: > 3 cm    Respiratory Devices and Support         Dental  no notable dental history     (+) Minor Abnormalities - some fillings, tiny chips      Cardiovascular          Rhythm and rate: regular     Pulmonary           breath sounds  "clear to auscultation           OUTSIDE LABS:  CBC:   Lab Results   Component Value Date    WBC 16.6 (H) 10/28/2024    WBC 10.1 02/08/2023    HGB 16.1 10/28/2024    HGB 15.4 02/08/2023    HCT 47.2 10/28/2024    HCT 45.4 02/08/2023     10/28/2024     02/08/2023     BMP:   Lab Results   Component Value Date     10/28/2024     02/08/2023    POTASSIUM 3.6 10/28/2024    POTASSIUM 3.8 02/08/2023    CHLORIDE 104 10/28/2024    CHLORIDE 103 02/08/2023    CO2 26 10/28/2024    CO2 23 02/08/2023    BUN 15.7 10/28/2024    BUN 18.9 02/08/2023    CR 1.15 10/28/2024    CR 1.02 02/08/2023     (H) 10/28/2024     (H) 02/08/2023     COAGS: No results found for: \"PTT\", \"INR\", \"FIBR\"  POC: No results found for: \"BGM\", \"HCG\", \"HCGS\"  HEPATIC:   Lab Results   Component Value Date    ALBUMIN 4.4 10/28/2024    PROTTOTAL 7.3 10/28/2024    ALT 42 10/28/2024    AST 17 10/28/2024    ALKPHOS 81 10/28/2024    BILITOTAL 0.5 10/28/2024     OTHER:   Lab Results   Component Value Date    LAISHA 9.8 10/28/2024    LIPASE 18 10/28/2024    TSH 1.81 02/08/2023       Anesthesia Plan    ASA Status:  2       Anesthesia Type: General.     - Airway: LMA   Induction: Intravenous, Propofol.   Maintenance: Balanced.        Consents    Anesthesia Plan(s) and associated risks, benefits, and realistic alternatives discussed. Questions answered and patient/representative(s) expressed understanding.     - Discussed:     - Discussed with:  Patient            Postoperative Care    Pain management: Multi-modal analgesia.   PONV prophylaxis: Ondansetron (or other 5HT-3)     Comments:               Darrin Pelletier MD    I have reviewed the pertinent notes and labs in the chart from the past 30 days and (re)examined the patient.  Any updates or changes from those notes are reflected in this note.               # Hypertension: Noted on problem list           # Obesity: Estimated body mass index is 34.63 kg/m  as calculated from the " "following:    Height as of this encounter: 1.854 m (6' 1\").    Weight as of this encounter: 119.1 kg (262 lb 8 oz).             "

## 2024-11-11 ENCOUNTER — PATIENT OUTREACH (OUTPATIENT)
Dept: CARE COORDINATION | Facility: CLINIC | Age: 39
End: 2024-11-11
Payer: COMMERCIAL

## 2024-11-14 ENCOUNTER — MYC REFILL (OUTPATIENT)
Dept: FAMILY MEDICINE | Facility: CLINIC | Age: 39
End: 2024-11-14
Payer: COMMERCIAL

## 2024-11-14 DIAGNOSIS — N13.5 URETEROPELVIC JUNCTION OBSTRUCTION: ICD-10-CM

## 2024-11-14 RX ORDER — PHENAZOPYRIDINE HYDROCHLORIDE 100 MG/1
100 TABLET, FILM COATED ORAL 3 TIMES DAILY PRN
Qty: 20 TABLET | Refills: 0 | Status: SHIPPED | OUTPATIENT
Start: 2024-11-14

## 2024-11-20 ENCOUNTER — LAB (OUTPATIENT)
Dept: LAB | Facility: CLINIC | Age: 39
End: 2024-11-20
Payer: COMMERCIAL

## 2024-11-20 DIAGNOSIS — R39.89 SUSPECTED UTI: ICD-10-CM

## 2024-11-20 DIAGNOSIS — R39.89 SUSPECTED UTI: Primary | ICD-10-CM

## 2024-11-20 LAB
ALBUMIN UR-MCNC: 30 MG/DL
APPEARANCE UR: CLEAR
BILIRUB UR QL STRIP: NEGATIVE
COLOR UR AUTO: YELLOW
GLUCOSE UR STRIP-MCNC: NEGATIVE MG/DL
HGB UR QL STRIP: ABNORMAL
KETONES UR STRIP-MCNC: NEGATIVE MG/DL
LEUKOCYTE ESTERASE UR QL STRIP: ABNORMAL
NITRATE UR QL: NEGATIVE
PH UR STRIP: 7 [PH] (ref 5–7)
SP GR UR STRIP: 1.01 (ref 1–1.03)
UROBILINOGEN UR STRIP-ACNC: 0.2 E.U./DL

## 2024-11-20 PROCEDURE — 87086 URINE CULTURE/COLONY COUNT: CPT

## 2024-11-20 PROCEDURE — 81003 URINALYSIS AUTO W/O SCOPE: CPT | Mod: QW

## 2024-11-21 LAB — BACTERIA UR CULT: NO GROWTH

## 2024-12-02 ENCOUNTER — OFFICE VISIT (OUTPATIENT)
Dept: UROLOGY | Facility: CLINIC | Age: 39
End: 2024-12-02
Payer: COMMERCIAL

## 2024-12-02 VITALS
WEIGHT: 260 LBS | HEIGHT: 73 IN | HEART RATE: 90 BPM | DIASTOLIC BLOOD PRESSURE: 90 MMHG | BODY MASS INDEX: 34.46 KG/M2 | SYSTOLIC BLOOD PRESSURE: 120 MMHG | OXYGEN SATURATION: 99 %

## 2024-12-02 DIAGNOSIS — Q62.11 HYDRONEPHROSIS WITH URETEROPELVIC JUNCTION (UPJ) OBSTRUCTION: ICD-10-CM

## 2024-12-02 DIAGNOSIS — N13.5 STRICTURE OR KINKING OF URETER: Primary | ICD-10-CM

## 2024-12-02 PROCEDURE — 52310 CYSTOSCOPY AND TREATMENT: CPT | Performed by: STUDENT IN AN ORGANIZED HEALTH CARE EDUCATION/TRAINING PROGRAM

## 2024-12-02 RX ORDER — LIDOCAINE HYDROCHLORIDE 20 MG/ML
JELLY TOPICAL ONCE
Status: COMPLETED | OUTPATIENT
Start: 2024-12-02 | End: 2024-12-02

## 2024-12-02 RX ORDER — LIDOCAINE HYDROCHLORIDE 20 MG/ML
JELLY TOPICAL ONCE
Status: DISCONTINUED | OUTPATIENT
Start: 2024-12-02 | End: 2024-12-02 | Stop reason: HOSPADM

## 2024-12-02 RX ADMIN — LIDOCAINE HYDROCHLORIDE 5 ML: 20 JELLY TOPICAL at 13:30

## 2024-12-02 NOTE — NURSING NOTE
Chief Complaint   Patient presents with    Stricture or Kinking of Ureter     Patient here today for stent removal       Prior to the start of the procedure and with procedural staff participation, I verbally confirmed the patient s identity using two indicators, relevant allergies, that the procedure was appropriate and matched the consent or emergent situation, and that the correct equipment/implants were available. Immediately prior to starting the procedure I conducted the Time Out with the procedural staff and re-confirmed the patient s name, procedure, and site/side. I have wiped the patient off with the povidone-Iodine solution, draped them,  used Lidocaine hydrochloride jelly, and instilled sterile water into the bladder. (The Joint Commission universal protocol was followed.)  Yes    Sedation (Moderate or Deep): None      5mL 2% lidocaine hydrochloride Urojet instilled into urethra.    NDC# [address]-1  Lot #: [id number]  Expiration Date: 06/26        Saray Mccartney NED

## 2024-12-02 NOTE — PROGRESS NOTES
"UROLOGY POST-OPERATIVE CLINIC VISIT    Arsalan Mittal is a 39 year-old male with a history of a right UPJ obstruction who underwent laparoscopic Pyeloplasty with Dr. Major in 2005. He remained without symptoms for several years until he presented to the ER on 10/28 with right-sided flank pain and was found to have hydronephrosis with a questionable calcification at his left UPJ. He underwent stent placement with myself at this time.     I then performed ureteroscopy 11/7/2024 and no stone was encountered, however there did appear to be a recurrent stricture at the site of his previous pyeloplasty:        He then underwent balloon dilation of his stricture and stent replacement. He presents today for stent removal.    Since surgery he has been doing well. Not much stent-related discomfort. He has been passing a few blood clots.     BP (!) 120/90   Pulse 90   Ht 1.854 m (6' 1\")   Wt 117.9 kg (260 lb)   SpO2 99%   BMI 34.30 kg/m    Gen: well appearing, NAD  : Normal appearing penis with an orthotopic meatus.    Procedures:  Male flexible cystoscopy with right ureteral stent removal    Patient: Arsalan Mittal  MRN: [id number]  YOB: 1985  Age: 39 year old  Sex: male    DATE OF PROCEDURE: 12/2/2024    PREOPERATIVE DIAGNOSIS:  Right ureteral stricture    POSTOPERATIVE DIAGNOSIS:  Same    PROCEDURE:  Flexible cystoscopy with right ureteral stent removal.    ATTENDING:  Manuela Kaplan MD    ANESTHESIA:  10 cc of viscous lidocaine.    ESTIMATED BLOOD LOSS:  None.    FINDINGS:  1. Normal anterior urethra  2. Normal posterior urethra  3. Patent bladder neck.  4. Stent seen emitting from right ureteral orifice- removed intact. Moderately encrusted.    PROCEDURE:  The patient was identified with name and medical record number. Informed consent was obtained. A time out was taken. All questions were answered. He was placed on the procedure room table. He was prepped and draped in a sterile fashion.  A time out " was performed.    A 16-Hungarian flexible cystoscope was inserted per urethra and carried through under direct vision. A flexible grasper was used to remove his stent which was intact. The scope, grasper, and stent were removed easily. Patient tolerated the procedure well.    ASSESSMENT:  Right ureteral stricture  Right hydroenephrosis    PLAN:  Discussed with Arsalan that we would perform a renal US in 4 weeks to assess for residual hydronephrosis. There is a chance the balloon dilation will be a durable fix for his recurrrent stricture.    If hydronephrosis or flank pain returns, we may pursue a NM renogram to assess the drainage of his kidney, however he will likely require re-do robotic pyeloplasty.     - RTC in 4 weeks with renal US prior    Manuela Kaplan MD  Reconstructive Urology  [facility]

## 2024-12-02 NOTE — LETTER
"12/2/2024       RE: Arsalan Mittal  [address] [place] [id number]     Dear Colleague,    Thank you for referring your patient, Arsalan Mittal, to the [facility] UROLOGY CLINIC EMI at [facility]. Please see a copy of my visit note below.    UROLOGY POST-OPERATIVE CLINIC VISIT    Arsalan Mittal is a 39 year-old male with a history of a right UPJ obstruction who underwent laparoscopic Pyeloplasty with Dr. Major in 2005. He remained without symptoms for several years until he presented to the ER on 10/28 with right-sided flank pain and was found to have hydronephrosis with a questionable calcification at his left UPJ. He underwent stent placement with myself at this time.     I then performed ureteroscopy 11/7/2024 and no stone was encountered, however there did appear to be a recurrent stricture at the site of his previous pyeloplasty:        He then underwent balloon dilation of his stricture and stent replacement. He presents today for stent removal.    Since surgery he has been doing well. Not much stent-related discomfort. He has been passing a few blood clots.     BP (!) 120/90   Pulse 90   Ht 1.854 m (6' 1\")   Wt 117.9 kg (260 lb)   SpO2 99%   BMI 34.30 kg/m    Gen: well appearing, NAD  : Normal appearing penis with an orthotopic meatus.    Procedures:  Male flexible cystoscopy with right ureteral stent removal    Patient: Arsalan Mittal  MRN: [id number]  YOB: 1985  Age: 39 year old  Sex: male    DATE OF PROCEDURE: 12/2/2024    PREOPERATIVE DIAGNOSIS:  Right ureteral stricture    POSTOPERATIVE DIAGNOSIS:  Same    PROCEDURE:  Flexible cystoscopy with right ureteral stent removal.    ATTENDING:  Manuela Kaplan MD    ANESTHESIA:  10 cc of viscous lidocaine.    ESTIMATED BLOOD LOSS:  None.    FINDINGS:  1. Normal anterior urethra  2. Normal posterior urethra  3. Patent bladder neck.  4. Stent seen emitting from right ureteral " orifice- removed intact. Moderately encrusted.    PROCEDURE:  The patient was identified with name and medical record number. Informed consent was obtained. A time out was taken. All questions were answered. He was placed on the procedure room table. He was prepped and draped in a sterile fashion.  A time out was performed.    A 16-Polish flexible cystoscope was inserted per urethra and carried through under direct vision. A flexible grasper was used to remove his stent which was intact. The scope, grasper, and stent were removed easily. Patient tolerated the procedure well.    ASSESSMENT:  Right ureteral stricture  Right hydroenephrosis    PLAN:  Discussed with Arsalan that we would perform a renal US in 4 weeks to assess for residual hydronephrosis. There is a chance the balloon dilation will be a durable fix for his recurrrent stricture.    If hydronephrosis or flank pain returns, we may pursue a NM renogram to assess the drainage of his kidney, however he will likely require re-do robotic pyeloplasty.     - RTC in 4 weeks with renal US prior    Manuela Kaplan MD  Reconstructive Urology  [facility]        Again, thank you for allowing me to participate in the care of your patient.      Sincerely,    Manuela Kaplan MD

## 2024-12-02 NOTE — PATIENT INSTRUCTIONS
"                    AFTER YOUR CYSTOSCOPY STENT REMOVAL  ?  ?  You have just completed a cystoscopy, or \"cysto\", which allowed your physician to learn more about your bladder (or to remove a stent placed after surgery). We suggest that you continue to avoid caffeine, fruit juice, and alcohol for the next 24 hours, however, you are encouraged to return to your normal activities.  ?  ?  A few things that are considered normal after your cystoscopy:  ?  * small amount of bleeding (or spotting) that clears within the next 24 hours  ?  * slight burning sensation with urination  ?  * sensation of needing to void (urinate) more frequently  ?  * the feeling of \"air\" in your urine  ?  * mild discomfort that is relieved with Tylenol    * bladder spasms  ?  ?  ?  Please contact our office promptly if you:  ?  * develop a fever above 101 degrees  ?  * are unable to urinate  ?  * develop bright red blood that does not stop  ?  * experience severe pain or swelling  ?  ?  ?  And of course, please contact our office with any concerns or questions [phone].  ?    "

## 2024-12-27 ENCOUNTER — HOSPITAL ENCOUNTER (OUTPATIENT)
Dept: ULTRASOUND IMAGING | Facility: CLINIC | Age: 39
Discharge: HOME | End: 2024-12-27
Attending: STUDENT IN AN ORGANIZED HEALTH CARE EDUCATION/TRAINING PROGRAM | Admitting: STUDENT IN AN ORGANIZED HEALTH CARE EDUCATION/TRAINING PROGRAM
Payer: COMMERCIAL

## 2024-12-27 DIAGNOSIS — N13.5 STRICTURE OR KINKING OF URETER: ICD-10-CM

## 2024-12-27 PROCEDURE — 76770 US EXAM ABDO BACK WALL COMP: CPT

## 2024-12-30 ENCOUNTER — OFFICE VISIT (OUTPATIENT)
Dept: UROLOGY | Facility: CLINIC | Age: 39
End: 2024-12-30
Payer: COMMERCIAL

## 2024-12-30 VITALS
DIASTOLIC BLOOD PRESSURE: 98 MMHG | OXYGEN SATURATION: 96 % | BODY MASS INDEX: 34.3 KG/M2 | SYSTOLIC BLOOD PRESSURE: 146 MMHG | WEIGHT: 260 LBS | HEART RATE: 92 BPM

## 2024-12-30 DIAGNOSIS — N13.5 STRICTURE OR KINKING OF URETER: ICD-10-CM

## 2024-12-30 DIAGNOSIS — N13.30 HYDRONEPHROSIS OF RIGHT KIDNEY: Primary | ICD-10-CM

## 2024-12-30 ASSESSMENT — PAIN SCALES - GENERAL: PAINLEVEL_OUTOF10: MILD PAIN (2)

## 2024-12-30 NOTE — LETTER
"12/30/2024       RE: Arsalan Mittal  [address] [place] [id number]     Dear Colleague,    Thank you for referring your patient, Arsalan Mittal, to the [facility] UROLOGY CLINIC EMI at [facility]. Please see a copy of my visit note below.    UROLOGY FOLLOW-UP CLINIC VISIT    Arsalan Mittal is a 39 year-old male with a history of a right UPJ obstruction who underwent laparoscopic Pyeloplasty with Dr. Major in 2005. He remained without symptoms for several years until he presented to the ER on 10/28 with right-sided flank pain and was found to have hydronephrosis with a questionable calcification at his left UPJ. He underwent stent placement with myself at this time.     I then performed ureteroscopy 11/7/2024 and no stone was encountered, however there did appear to be a recurrent stricture at the site of his previous pyeloplasty:        He then underwent balloon dilation of his stricture and stent replacement. The stent was left in place for 4 weeks.    12/30/24: Follow up with renal US. US demonstrates mild right hydro- improved from prior CT. He feels a constant \"fullness\" in his right flank but no pain. Also occasional RLQ pain. No correlation to voiding.    BP (!) 146/98   Pulse 92   Wt 117.9 kg (260 lb)   SpO2 96%   BMI 34.30 kg/m    Gen: well appearing, NAD    IMAGING:   Renal US 12/27/24 (independent interpretation):  Mild right hydronephrosis      ASSESSMENT:  Right ureteral stricture  Right hydroenephrosis    PLAN:  Discussed with Arsalan that his hydronephrosis is mild on US today. He also had about this degree of hydro on retrograde pyelogram after his stent was placed. I do suspect he has an aspect of chronic renal pelvis dilation.    Would recommend repeating a renal US in 2 months. If worsening hydro, then proceed with NM renogram.     If he develops flank pain prior to his next US he will notify us and we will order the renogram in [place] " of his ultrasound.    - RTC in 2 months with renal US prior    Manuela Kaplan MD  Reconstructive Urology  [facility]        Again, thank you for allowing me to participate in the care of your patient.      Sincerely,    Manuela Kaplan MD

## 2024-12-30 NOTE — NURSING NOTE
Chief Complaint   Patient presents with    Hematuria     12- ultrasound done of the renal     Talk about the ultrasound results   No blood in the urine   Yuval Magallanes, CMA

## 2024-12-30 NOTE — PROGRESS NOTES
"UROLOGY FOLLOW-UP CLINIC VISIT    Arsalan Mittal is a 39 year-old male with a history of a right UPJ obstruction who underwent laparoscopic Pyeloplasty with Dr. Major in 2005. He remained without symptoms for several years until he presented to the ER on 10/28 with right-sided flank pain and was found to have hydronephrosis with a questionable calcification at his left UPJ. He underwent stent placement with myself at this time.     I then performed ureteroscopy 11/7/2024 and no stone was encountered, however there did appear to be a recurrent stricture at the site of his previous pyeloplasty:        He then underwent balloon dilation of his stricture and stent replacement. The stent was left in place for 4 weeks.    12/30/24: Follow up with renal US. US demonstrates mild right hydro- improved from prior CT. He feels a constant \"fullness\" in his right flank but no pain. Also occasional RLQ pain. No correlation to voiding.    BP (!) 146/98   Pulse 92   Wt 117.9 kg (260 lb)   SpO2 96%   BMI 34.30 kg/m    Gen: well appearing, NAD    IMAGING:   Renal US 12/27/24 (independent interpretation):  Mild right hydronephrosis      ASSESSMENT:  Right ureteral stricture  Right hydroenephrosis    PLAN:  Discussed with Arsalan that his hydronephrosis is mild on US today. He also had about this degree of hydro on retrograde pyelogram after his stent was placed. I do suspect he has an aspect of chronic renal pelvis dilation.    Would recommend repeating a renal US in 2 months. If worsening hydro, then proceed with NM renogram.     If he develops flank pain prior to his next US he will notify us and we will order the renogram in [place] of his ultrasound.    - RTC in 2 months with renal US prior    Manuela Kaplan MD  Reconstructive Urology  [facility]      "

## 2025-02-19 ENCOUNTER — HOSPITAL ENCOUNTER (OUTPATIENT)
Dept: NUCLEAR MEDICINE | Facility: CLINIC | Age: 40
Setting detail: NUCLEAR MEDICINE
Discharge: HOME | End: 2025-02-19
Attending: STUDENT IN AN ORGANIZED HEALTH CARE EDUCATION/TRAINING PROGRAM
Payer: COMMERCIAL

## 2025-02-19 DIAGNOSIS — N13.5 STRICTURE OR KINKING OF URETER: ICD-10-CM

## 2025-02-19 PROCEDURE — A9562 TC99M MERTIATIDE: HCPCS | Performed by: STUDENT IN AN ORGANIZED HEALTH CARE EDUCATION/TRAINING PROGRAM

## 2025-02-19 PROCEDURE — 250N000011 HC RX IP 250 OP 636: Performed by: STUDENT IN AN ORGANIZED HEALTH CARE EDUCATION/TRAINING PROGRAM

## 2025-02-19 PROCEDURE — 343N000001 HC RX 343 MED OP 636: Performed by: STUDENT IN AN ORGANIZED HEALTH CARE EDUCATION/TRAINING PROGRAM

## 2025-02-19 PROCEDURE — 78708 K FLOW/FUNCT IMAGE W/DRUG: CPT

## 2025-02-19 RX ORDER — FUROSEMIDE 10 MG/ML
20 INJECTION INTRAMUSCULAR; INTRAVENOUS ONCE
Status: COMPLETED | OUTPATIENT
Start: 2025-02-19 | End: 2025-02-19

## 2025-02-19 RX ADMIN — TECHNESCAN TC 99M MERTIATIDE 7.6 MILLICURIE: 1 INJECTION, POWDER, LYOPHILIZED, FOR SOLUTION INTRAVENOUS at 09:11

## 2025-02-19 RX ADMIN — FUROSEMIDE 20 MG: 10 INJECTION, SOLUTION INTRAMUSCULAR; INTRAVENOUS at 09:32

## 2025-03-03 ENCOUNTER — OFFICE VISIT (OUTPATIENT)
Dept: UROLOGY | Facility: CLINIC | Age: 40
End: 2025-03-03
Payer: COMMERCIAL

## 2025-03-03 VITALS
WEIGHT: 260 LBS | BODY MASS INDEX: 33.37 KG/M2 | HEART RATE: 80 BPM | DIASTOLIC BLOOD PRESSURE: 93 MMHG | HEIGHT: 74 IN | OXYGEN SATURATION: 97 % | SYSTOLIC BLOOD PRESSURE: 142 MMHG

## 2025-03-03 DIAGNOSIS — R10.31 GROIN PAIN, RIGHT: ICD-10-CM

## 2025-03-03 DIAGNOSIS — N13.30 HYDRONEPHROSIS OF RIGHT KIDNEY: ICD-10-CM

## 2025-03-03 DIAGNOSIS — N13.5 URETEROPELVIC JUNCTION (UPJ) OBSTRUCTION, RIGHT: Primary | ICD-10-CM

## 2025-03-03 PROCEDURE — 1126F AMNT PAIN NOTED NONE PRSNT: CPT | Performed by: STUDENT IN AN ORGANIZED HEALTH CARE EDUCATION/TRAINING PROGRAM

## 2025-03-03 PROCEDURE — 99214 OFFICE O/P EST MOD 30 MIN: CPT | Performed by: STUDENT IN AN ORGANIZED HEALTH CARE EDUCATION/TRAINING PROGRAM

## 2025-03-03 PROCEDURE — 3080F DIAST BP >= 90 MM HG: CPT | Performed by: STUDENT IN AN ORGANIZED HEALTH CARE EDUCATION/TRAINING PROGRAM

## 2025-03-03 PROCEDURE — 3077F SYST BP >= 140 MM HG: CPT | Performed by: STUDENT IN AN ORGANIZED HEALTH CARE EDUCATION/TRAINING PROGRAM

## 2025-03-03 ASSESSMENT — PAIN SCALES - GENERAL: PAINLEVEL_OUTOF10: NO PAIN (0)

## 2025-03-03 NOTE — PROGRESS NOTES
"UROLOGY FOLLOW-UP CLINIC VISIT    Arsalan Mittal is a 39 year-old male with a history of a right UPJ obstruction who underwent laparoscopic Pyeloplasty with Dr. Major in 2005. He remained without symptoms for several years until he presented to the ER on 10/28 with right-sided flank pain and was found to have hydronephrosis with a questionable calcification at his right UPJ. He underwent stent placement with myself at this time.     I then performed ureteroscopy 11/7/2024 and no stone was encountered, however there did appear to be a recurrent stricture at the site of his previous pyeloplasty:        He then underwent balloon dilation of his stricture and stent replacement. The stent was left in place for 4 weeks.    3/3/24: Arsalan contacted our clinic last month with progressing right-sided discomfort. We opted to perform a NM renogram which demonstrated equal split function and a capacious right collecting system without obstruction.     He reports mostly a right-sided low discomfort that is very positional- almost feels associated with his spermatic cord. He has right-sided low back \"fullness\" also (wouldn't describe this as painful). He reports less discomfort with physical activity. Sitting worse than standing. No correlation to fluid intake. The pain was still present with the stent  in place.     Does not feel it is severe enough to warrant NSAIDs. 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    12/30/24: Follow up with renal US. US demonstrates mild right hydro- improved from prior CT. He feels a constant \"fullness\" in his right flank but no pain. Also occasional RLQ pain. No correlation to voiding.    BP (!) 142/93   Pulse 80   Ht 1.88 m (6' 2\")   Wt 117.9 kg (260 lb)   SpO2 97%   BMI 33.38 kg/m    Gen: well appearing, NAD  : Circumcised with an orthotopic meatus. Bilateral testicles are descended without masses. No hernias on either side. No palpable " abnormalities of the spermatic cord. No point-tenderness.     IMAGING:   NM renogram 2/19/25:  55% function on right, 45% function on the left  Both with downward drainage curves- no evidence of obstruction        Renal US 12/27/24 (independent interpretation):  Mild right hydronephrosis      ASSESSMENT:  Right ureteral stricture  Right hydronephrosis  Right groin pain    PLAN:  Discussed with Arsalan that his NM Renogram does not demonstrate evidence of obstruction. His right collecting system does appear capacious, however this is likely chronic since his pyeloplasty.     I am unsure what to make of his right groin/low back pain. It does not have typical features of renal colic. It is also notable that this pain did not improve with stent placement when his kidney is maximally drained. I fear that a re-do pyeloplasty may not improve these symptoms.     I would like him to return in 6 months with a repeat renogram. If drainage has worsened, we will consider surgical repair/repeat endoscopic evaluation. He will contact us if his flank pain returns prior to this and we will move up his scan.     - RTC in 6 months with NM renal scan prior    Manuela Kaplan MD  Reconstructive Urology  [facility]

## 2025-03-03 NOTE — NURSING NOTE
Chief Complaint   Patient presents with    Hydroneprosis    stricture or kinking of ureter    Allyn Bunch LPN

## 2025-03-03 NOTE — LETTER
"3/3/2025       RE: Arsalan Mittal  [address] [place] [id number]     Dear Colleague,    Thank you for referring your patient, Arsalan Mittal, to the [facility] UROLOGY CLINIC EMI at [facility]. Please see a copy of my visit note below.    UROLOGY FOLLOW-UP CLINIC VISIT    Arsalan Mittal is a 39 year-old male with a history of a right UPJ obstruction who underwent laparoscopic Pyeloplasty with Dr. Major in 2005. He remained without symptoms for several years until he presented to the ER on 10/28 with right-sided flank pain and was found to have hydronephrosis with a questionable calcification at his right UPJ. He underwent stent placement with myself at this time.     I then performed ureteroscopy 11/7/2024 and no stone was encountered, however there did appear to be a recurrent stricture at the site of his previous pyeloplasty:        He then underwent balloon dilation of his stricture and stent replacement. The stent was left in place for 4 weeks.    3/3/24: Arsalan contacted our clinic last month with progressing right-sided discomfort. We opted to perform a NM renogram which demonstrated equal split function and a capacious right collecting system without obstruction.     He reports mostly a right-sided low discomfort that is very positional- almost feels associated with his spermatic cord. He has right-sided low back \"fullness\" also (wouldn't describe this as painful). He reports less discomfort with physical activity. Sitting worse than standing. No correlation to fluid intake. The pain was still present with the stent  in place.     Does not feel it is severe enough to warrant NSAIDs. 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    12/30/24: Follow up with renal US. US demonstrates mild right hydro- improved from prior CT. He feels a constant \"fullness\" in his right flank but no pain. Also occasional RLQ pain. " "No correlation to voiding.    BP (!) 142/93   Pulse 80   Ht 1.88 m (6' 2\")   Wt 117.9 kg (260 lb)   SpO2 97%   BMI 33.38 kg/m    Gen: well appearing, NAD  : Circumcised with an orthotopic meatus. Bilateral testicles are descended without masses. No hernias on either side. No palpable abnormalities of the spermatic cord. No point-tenderness.     IMAGING:   NM renogram 2/19/25:  55% function on right, 45% function on the left  Both with downward drainage curves- no evidence of obstruction        Renal US 12/27/24 (independent interpretation):  Mild right hydronephrosis      ASSESSMENT:  Right ureteral stricture  Right hydronephrosis  Right groin pain    PLAN:  Discussed with Arsalan that his NM Renogram does not demonstrate evidence of obstruction. His right collecting system does appear capacious, however this is likely chronic since his pyeloplasty.     I am unsure what to make of his right groin/low back pain. It does not have typical features of renal colic. It is also notable that this pain did not improve with stent placement when his kidney is maximally drained. I fear that a re-do pyeloplasty may not improve these symptoms.     I would like him to return in 6 months with a repeat renogram. If drainage has worsened, we will consider surgical repair/repeat endoscopic evaluation. He will contact us if his flank pain returns prior to this and we will move up his scan.     - RTC in 6 months with NM renal scan prior    Manuela Kaplan MD  Reconstructive Urology  [facility]        Again, thank you for allowing me to participate in the care of your patient.      Sincerely,    Manuela Kaplan MD    "

## 2025-03-24 DIAGNOSIS — I10 HYPERTENSION, UNSPECIFIED TYPE: ICD-10-CM

## 2025-03-24 DIAGNOSIS — G43.709 CHRONIC MIGRAINE WITHOUT AURA WITHOUT STATUS MIGRAINOSUS, NOT INTRACTABLE: ICD-10-CM

## 2025-03-24 DIAGNOSIS — K21.9 GASTROESOPHAGEAL REFLUX DISEASE WITHOUT ESOPHAGITIS: ICD-10-CM

## 2025-03-24 RX ORDER — TOPIRAMATE 50 MG/1
50 TABLET, FILM COATED ORAL DAILY
Qty: 90 TABLET | Refills: 1 | Status: SHIPPED | OUTPATIENT
Start: 2025-03-24

## 2025-03-24 RX ORDER — OMEPRAZOLE 20 MG/1
20 CAPSULE, DELAYED RELEASE ORAL 2 TIMES DAILY
Qty: 180 CAPSULE | Refills: 1 | Status: SHIPPED | OUTPATIENT
Start: 2025-03-24

## 2025-03-24 RX ORDER — CANDESARTAN 8 MG/1
8 TABLET ORAL DAILY
Qty: 90 TABLET | Refills: 3 | Status: SHIPPED | OUTPATIENT
Start: 2025-03-24

## 2025-03-24 NOTE — TELEPHONE ENCOUNTER
"Patient is scheduled for 430pm virtual for \"medication refill\". He was last seen for annual preventative in 2023. Please reach out to patient to help him schedule face to face preventative as he is overdue for this. I can fill medication to get him to that date.     This will save him the cost of virtual today and then face to face before the end of the year.    Thanks,  Bernard Patiño PA-C on 3/24/2025 at 11:06 AM    "

## 2025-03-24 NOTE — TELEPHONE ENCOUNTER
RN called patient and he agreed to just schedule a sooner annual. RN pended medications needed at this time. RN offered to reschedule appointment for patient, but he will do this when he has access to calendar and do it through [facility].     ELENO RobleroN, RN

## 2025-06-03 ENCOUNTER — RESULTS FOLLOW-UP (OUTPATIENT)
Dept: FAMILY MEDICINE | Facility: OTHER | Age: 40
End: 2025-06-03
Payer: COMMERCIAL

## 2025-06-03 DIAGNOSIS — R79.89 LOW TESTOSTERONE IN MALE: Primary | ICD-10-CM

## 2025-06-04 ENCOUNTER — PATIENT OUTREACH (OUTPATIENT)
Dept: CARE COORDINATION | Facility: CLINIC | Age: 40
End: 2025-06-04
Payer: COMMERCIAL

## 2025-06-04 NOTE — TELEPHONE ENCOUNTER
Endocrinology referral faxed to [facility] in [place].

## 2025-06-09 ENCOUNTER — TELEPHONE (OUTPATIENT)
Dept: FAMILY MEDICINE | Facility: OTHER | Age: 40
End: 2025-06-09
Payer: COMMERCIAL

## 2025-06-09 DIAGNOSIS — R79.89 LOW TESTOSTERONE IN MALE: Primary | ICD-10-CM

## 2025-06-09 NOTE — TELEPHONE ENCOUNTER
Fax received form [facility] [place].  Patient needs labs prior to appointment.  Form placed in JR bin for review.

## 2025-06-10 ENCOUNTER — MYC MEDICAL ADVICE (OUTPATIENT)
Dept: FAMILY MEDICINE | Facility: OTHER | Age: 40
End: 2025-06-10
Payer: COMMERCIAL

## 2025-06-17 ENCOUNTER — RESULTS FOLLOW-UP (OUTPATIENT)
Dept: FAMILY MEDICINE | Facility: OTHER | Age: 40
End: 2025-06-17
Payer: COMMERCIAL

## 2025-07-15 ENCOUNTER — TRANSFERRED RECORDS (OUTPATIENT)
Dept: HEALTH INFORMATION MANAGEMENT | Facility: CLINIC | Age: 40
End: 2025-07-15
Payer: COMMERCIAL

## (undated) DEVICE — INFLATION DEVICE ENCORE  26 PRESSURE GAUGE 20ML M0067101140

## (undated) DEVICE — TUBING SUCTION MEDI-VAC SOFT 3/16"X20' N520A

## (undated) DEVICE — PAD CHUX UNDERPAD 23X24" 7136

## (undated) DEVICE — SYR 20ML LL W/O NDL 302830

## (undated) DEVICE — BAG DRAIN URO FOR SIEMENS 8MM ADAPTER NS CC164NS-A

## (undated) DEVICE — GLOVE BIOGEL PI MICRO SZ 6.5 48565

## (undated) DEVICE — GUIDEWIRE SENSOR DUAL FLEX STR 0.035"X150CM M0066703080

## (undated) DEVICE — DRAPE GYN/UROLOGY FLUID POUCH TUR 29455

## (undated) DEVICE — SOL WATER IRRIG 3000ML BAG 2B7117

## (undated) DEVICE — CATH URETERAL TIGERTAIL 05FR 70CM 139005

## (undated) DEVICE — RAD RX ISOVUE 300 (50ML) 61% IOPAMIDOL CHARGE PER ML

## (undated) DEVICE — PACK TUR CUSTOM SBA15RUFSE

## (undated) DEVICE — LINEN TOWEL PACK X5 5464

## (undated) DEVICE — CATH BALLOON DILATATION UROMAX ULTRA 18FRX4CM M0062251020

## (undated) DEVICE — GLOVE BIOGEL PI MICRO INDICATOR UNDERGLOVE SZ 7.0 48970

## (undated) DEVICE — SOL NACL 0.9% IRRIG 3000ML BAG 2B7477

## (undated) RX ORDER — OXYCODONE HYDROCHLORIDE 5 MG/1
TABLET ORAL
Status: DISPENSED
Start: 2024-11-07

## (undated) RX ORDER — PROPOFOL 10 MG/ML
INJECTION, EMULSION INTRAVENOUS
Status: DISPENSED
Start: 2024-10-28

## (undated) RX ORDER — ONDANSETRON 2 MG/ML
INJECTION INTRAMUSCULAR; INTRAVENOUS
Status: DISPENSED
Start: 2024-10-28

## (undated) RX ORDER — FENTANYL CITRATE 50 UG/ML
INJECTION, SOLUTION INTRAMUSCULAR; INTRAVENOUS
Status: DISPENSED
Start: 2024-11-07

## (undated) RX ORDER — FENTANYL CITRATE 50 UG/ML
INJECTION, SOLUTION INTRAMUSCULAR; INTRAVENOUS
Status: DISPENSED
Start: 2024-10-28